# Patient Record
Sex: MALE | Race: WHITE | Employment: UNEMPLOYED | URBAN - METROPOLITAN AREA
[De-identification: names, ages, dates, MRNs, and addresses within clinical notes are randomized per-mention and may not be internally consistent; named-entity substitution may affect disease eponyms.]

---

## 2017-05-09 ENCOUNTER — APPOINTMENT (OUTPATIENT)
Dept: GENERAL RADIOLOGY | Age: 67
DRG: 198 | End: 2017-05-09
Attending: EMERGENCY MEDICINE
Payer: MEDICAID

## 2017-05-09 ENCOUNTER — HOSPITAL ENCOUNTER (INPATIENT)
Age: 67
LOS: 1 days | Discharge: HOME | DRG: 198 | End: 2017-05-11
Attending: EMERGENCY MEDICINE | Admitting: INTERNAL MEDICINE
Payer: MEDICAID

## 2017-05-09 ENCOUNTER — APPOINTMENT (OUTPATIENT)
Dept: CT IMAGING | Age: 67
DRG: 198 | End: 2017-05-09
Attending: EMERGENCY MEDICINE
Payer: MEDICAID

## 2017-05-09 DIAGNOSIS — E78.01 FAMILIAL HYPERCHOLESTEROLEMIA: ICD-10-CM

## 2017-05-09 DIAGNOSIS — E11.9 CONTROLLED TYPE 2 DIABETES MELLITUS WITHOUT COMPLICATION, WITH LONG-TERM CURRENT USE OF INSULIN (HCC): ICD-10-CM

## 2017-05-09 DIAGNOSIS — Z79.4 CONTROLLED TYPE 2 DIABETES MELLITUS WITHOUT COMPLICATION, WITH LONG-TERM CURRENT USE OF INSULIN (HCC): ICD-10-CM

## 2017-05-09 DIAGNOSIS — R42 VERTIGO: Primary | ICD-10-CM

## 2017-05-09 DIAGNOSIS — I25.708 CORONARY ARTERY DISEASE OF BYPASS GRAFT OF NATIVE HEART WITH STABLE ANGINA PECTORIS (HCC): ICD-10-CM

## 2017-05-09 DIAGNOSIS — Z86.73 HISTORY OF STROKE: ICD-10-CM

## 2017-05-09 DIAGNOSIS — R42 DIZZINESS AND GIDDINESS: ICD-10-CM

## 2017-05-09 LAB
ALBUMIN SERPL BCP-MCNC: 3.8 G/DL (ref 3.5–5)
ALBUMIN/GLOB SERPL: 1 {RATIO} (ref 1.1–2.2)
ALP SERPL-CCNC: 100 U/L (ref 45–117)
ALT SERPL-CCNC: 33 U/L (ref 12–78)
ANION GAP BLD CALC-SCNC: 8 MMOL/L (ref 5–15)
AST SERPL W P-5'-P-CCNC: 14 U/L (ref 15–37)
BASOPHILS # BLD AUTO: 0 K/UL (ref 0–0.1)
BASOPHILS # BLD: 0 % (ref 0–1)
BILIRUB SERPL-MCNC: 0.9 MG/DL (ref 0.2–1)
BUN SERPL-MCNC: 15 MG/DL (ref 6–20)
BUN/CREAT SERPL: 14 (ref 12–20)
CALCIUM SERPL-MCNC: 9 MG/DL (ref 8.5–10.1)
CHLORIDE SERPL-SCNC: 105 MMOL/L (ref 97–108)
CK SERPL-CCNC: 131 U/L (ref 39–308)
CO2 SERPL-SCNC: 26 MMOL/L (ref 21–32)
CREAT SERPL-MCNC: 1.08 MG/DL (ref 0.7–1.3)
EOSINOPHIL # BLD: 0.2 K/UL (ref 0–0.4)
EOSINOPHIL NFR BLD: 2 % (ref 0–7)
ERYTHROCYTE [DISTWIDTH] IN BLOOD BY AUTOMATED COUNT: 12.8 % (ref 11.5–14.5)
GLOBULIN SER CALC-MCNC: 4 G/DL (ref 2–4)
GLUCOSE BLD STRIP.AUTO-MCNC: 186 MG/DL (ref 65–100)
GLUCOSE SERPL-MCNC: 191 MG/DL (ref 65–100)
HCT VFR BLD AUTO: 41.6 % (ref 36.6–50.3)
HGB BLD-MCNC: 14.5 G/DL (ref 12.1–17)
LYMPHOCYTES # BLD AUTO: 20 % (ref 12–49)
LYMPHOCYTES # BLD: 1.5 K/UL (ref 0.8–3.5)
MCH RBC QN AUTO: 29.2 PG (ref 26–34)
MCHC RBC AUTO-ENTMCNC: 34.9 G/DL (ref 30–36.5)
MCV RBC AUTO: 83.9 FL (ref 80–99)
MONOCYTES # BLD: 0.6 K/UL (ref 0–1)
MONOCYTES NFR BLD AUTO: 8 % (ref 5–13)
NEUTS SEG # BLD: 5.4 K/UL (ref 1.8–8)
NEUTS SEG NFR BLD AUTO: 70 % (ref 32–75)
PLATELET # BLD AUTO: 228 K/UL (ref 150–400)
POTASSIUM SERPL-SCNC: 3.7 MMOL/L (ref 3.5–5.1)
PROT SERPL-MCNC: 7.8 G/DL (ref 6.4–8.2)
RBC # BLD AUTO: 4.96 M/UL (ref 4.1–5.7)
SERVICE CMNT-IMP: ABNORMAL
SODIUM SERPL-SCNC: 139 MMOL/L (ref 136–145)
TROPONIN I SERPL-MCNC: <0.04 NG/ML
WBC # BLD AUTO: 7.6 K/UL (ref 4.1–11.1)

## 2017-05-09 PROCEDURE — 96374 THER/PROPH/DIAG INJ IV PUSH: CPT

## 2017-05-09 PROCEDURE — 93005 ELECTROCARDIOGRAM TRACING: CPT

## 2017-05-09 PROCEDURE — 36415 COLL VENOUS BLD VENIPUNCTURE: CPT | Performed by: EMERGENCY MEDICINE

## 2017-05-09 PROCEDURE — 82962 GLUCOSE BLOOD TEST: CPT

## 2017-05-09 PROCEDURE — 74011000250 HC RX REV CODE- 250: Performed by: EMERGENCY MEDICINE

## 2017-05-09 PROCEDURE — 85025 COMPLETE CBC W/AUTO DIFF WBC: CPT | Performed by: EMERGENCY MEDICINE

## 2017-05-09 PROCEDURE — 99285 EMERGENCY DEPT VISIT HI MDM: CPT

## 2017-05-09 PROCEDURE — 84484 ASSAY OF TROPONIN QUANT: CPT | Performed by: EMERGENCY MEDICINE

## 2017-05-09 PROCEDURE — 71020 XR CHEST PA LAT: CPT

## 2017-05-09 PROCEDURE — 94761 N-INVAS EAR/PLS OXIMETRY MLT: CPT

## 2017-05-09 PROCEDURE — 74011250636 HC RX REV CODE- 250/636: Performed by: EMERGENCY MEDICINE

## 2017-05-09 PROCEDURE — 74011250637 HC RX REV CODE- 250/637: Performed by: EMERGENCY MEDICINE

## 2017-05-09 PROCEDURE — 70450 CT HEAD/BRAIN W/O DYE: CPT

## 2017-05-09 PROCEDURE — 80053 COMPREHEN METABOLIC PANEL: CPT | Performed by: EMERGENCY MEDICINE

## 2017-05-09 PROCEDURE — 96361 HYDRATE IV INFUSION ADD-ON: CPT

## 2017-05-09 PROCEDURE — 82550 ASSAY OF CK (CPK): CPT | Performed by: EMERGENCY MEDICINE

## 2017-05-09 RX ORDER — ASPIRIN 325 MG
325 TABLET ORAL ONCE
Status: COMPLETED | OUTPATIENT
Start: 2017-05-09 | End: 2017-05-09

## 2017-05-09 RX ORDER — ATORVASTATIN CALCIUM 10 MG/1
10 TABLET, FILM COATED ORAL DAILY
Status: ON HOLD | COMMUNITY
End: 2017-05-10

## 2017-05-09 RX ORDER — GUAIFENESIN 100 MG/5ML
81 LIQUID (ML) ORAL DAILY
COMMUNITY

## 2017-05-09 RX ORDER — GLIPIZIDE 10 MG/1
10 TABLET ORAL 2 TIMES DAILY
COMMUNITY

## 2017-05-09 RX ORDER — MECLIZINE HCL 12.5 MG 12.5 MG/1
25 TABLET ORAL
Status: COMPLETED | OUTPATIENT
Start: 2017-05-09 | End: 2017-05-09

## 2017-05-09 RX ORDER — ASPIRIN 325 MG
325 TABLET ORAL
Status: DISCONTINUED | OUTPATIENT
Start: 2017-05-09 | End: 2017-05-09

## 2017-05-09 RX ADMIN — SODIUM CHLORIDE 1000 ML: 900 INJECTION, SOLUTION INTRAVENOUS at 20:42

## 2017-05-09 RX ADMIN — SODIUM CHLORIDE 10 MG: 9 INJECTION INTRAMUSCULAR; INTRAVENOUS; SUBCUTANEOUS at 20:42

## 2017-05-09 RX ADMIN — MECLIZINE 25 MG: 12.5 TABLET ORAL at 21:30

## 2017-05-09 RX ADMIN — ASPIRIN 325 MG ORAL TABLET 325 MG: 325 PILL ORAL at 23:26

## 2017-05-09 NOTE — IP AVS SNAPSHOT
[address] [facility] 
[phone] Patient: Vasiliy Springer MRN: [id number] XIK:[id number]/4/2064 You are allergic to the following No active allergies Recent Documentation Height Weight BMI Smoking Status 1.6 m 61.1 kg 23.86 kg/m2 Never Smoker Emergency Contacts Name Discharge Info Relation Home Work Mobile [address] [facility] CAREGIVER [3] Child [2] [phone] About your hospitalization You were admitted on:  May 10, 2017 You last received care in the:  [facility] 3 NEUROSCIENCE TELEMETRY You were discharged on:  May 11, 2017 Unit phone number:  [phone] Why you were hospitalized Your primary diagnosis was:  Not on File Your diagnoses also included:  Chest Pain Providers Seen During Your Hospitalizations Provider Role Specialty Primary office phone Jose Lóepz MD Attending Provider Emergency Medicine [phone] Ellis Miranda MD Attending Provider Internal Medicine [phone] Your Primary Care Physician (PCP) Primary Care Physician Office Phone Office Fax NONE ** None ** ** None ** Follow-up Information Follow up With Details Comments Contact Info None   None (395) Patient stated that they have no PCP Current Discharge Medication List  
  
CONTINUE these medications which have CHANGED Dose & Instructions Dispensing Information Comments Morning Noon Evening Bedtime  
 atorvastatin 20 mg tablet Commonly known as:  LIPITOR What changed:  Another medication with the same name was removed. Continue taking this medication, and follow the directions you see here. Your last dose was: Your next dose is:    
   
   
 Dose:  20 mg Take 20 mg by mouth daily. Refills:  0  
     
   
   
   
  
 metoprolol tartrate 25 mg tablet Commonly known as:  LOPRESSOR  
 What changed:   
- how much to take - when to take this Your last dose was: Your next dose is:    
   
   
 Dose:  12.5 mg Take 0.5 Tabs by mouth every twelve (12) hours for 30 days. Quantity:  30 Tab Refills:  0 CONTINUE these medications which have NOT CHANGED Dose & Instructions Dispensing Information Comments Morning Noon Evening Bedtime  
 aspirin 81 mg chewable tablet Your last dose was: Your next dose is:    
   
   
 Dose:  81 mg Take 81 mg by mouth daily. Refills:  0  
     
   
   
   
  
 clopidogrel 75 mg Tab Commonly known as:  PLAVIX Your last dose was: Your next dose is:    
   
   
 Dose:  75 mg Take 75 mg by mouth daily. Refills:  0  
     
   
   
   
  
 fish oil-omega-3 fatty acids 300-500 mg Cap Your last dose was: Your next dose is:    
   
   
 Dose:  1000 mg Take 1,000 mg by mouth. Refills:  0  
     
   
   
   
  
 glipiZIDE 10 mg tablet Commonly known as:  Majo Galvan Your last dose was: Your next dose is:    
   
   
 Dose:  10 mg Take 10 mg by mouth two (2) times a day. Refills:  0  
     
   
   
   
  
 valsartan 160 mg tablet Commonly known as:  DIOVAN Your last dose was: Your next dose is:    
   
   
 Dose:  160 mg Take 160 mg by mouth daily. Refills:  0 STOP taking these medications   
 glyBURIDE 1.25 mg tablet Commonly known as:  Davion Hilliard Where to Get Your Medications Information on where to get these meds will be given to you by the nurse or doctor. ! Ask your nurse or doctor about these medications  
  metoprolol tartrate 25 mg tablet Discharge Instructions None Discharge Orders Procedure Order Date Status Priority Quantity Spec Type Associated Dx ACTIVITY AFTER DISCHARGE Patient should: Resume activity as tolerated. 05/11/17 1251 Normal Routine 1 Questions: Patient should:  Resume activity as tolerated. DIET CARDIAC No options chosen 05/11/17 1251 Normal Routine 1 Questions: Additional options:  No options chosen Introducing [facility] SERVICES! [facility] introduces [employer] patient portal. Now you can access parts of your medical record, email your doctor's office, and request medication refills online. 1. In your internet browser, go to [email or web address] 2. Click on the First Time User? Click Here link in the Sign In box. You will see the New Member Sign Up page. 3. Enter your [employer] Access Code exactly as it appears below. You will not need to use this code after youve completed the sign-up process. If you do not sign up before the expiration date, you must request a new code. · [employer] Access Code: [email or web address] Expires: 8/7/2017  8:23 PM 
 
4. Enter the last four digits of your Social Security Number (xxxx) and Date of Birth (mm/dd/yyyy) as indicated and click Submit. You will be taken to the next sign-up page. 5. Create a [employer] ID. This will be your [employer] login ID and cannot be changed, so think of one that is secure and easy to remember. 6. Create a [employer] password. You can change your password at any time. 7. Enter your Password Reset Question and Answer. This can be used at a later time if you forget your password. 8. Enter your e-mail address. You will receive e-mail notification when new information is available in [address] 9. Click Sign Up. You can now view and download portions of your medical record. 10. Click the Download Summary menu link to download a portable copy of your medical information. If you have questions, please visit the Frequently Asked Questions section of the [employer] website. Remember, [employer] is NOT to be used for urgent needs. For medical emergencies, dial 911. Now available from your iPhone and Android! General Information Please provide this summary of care documentation to your next provider. Patient Signature:  ____________________________________________________________ Date:  ____________________________________________________________  
  
Arabella Canes Provider Signature:  ____________________________________________________________ Date:  ____________________________________________________________

## 2017-05-09 NOTE — IP AVS SNAPSHOT
Current Discharge Medication List  
  
CONTINUE these medications which have CHANGED Dose & Instructions Dispensing Information Comments Morning Noon Evening Bedtime  
 atorvastatin 20 mg tablet Commonly known as:  LIPITOR What changed:  Another medication with the same name was removed. Continue taking this medication, and follow the directions you see here. Your last dose was: Your next dose is:    
   
   
 Dose:  20 mg Take 20 mg by mouth daily. Refills:  0  
     
   
   
   
  
 metoprolol tartrate 25 mg tablet Commonly known as:  LOPRESSOR What changed:   
- how much to take - when to take this Your last dose was: Your next dose is:    
   
   
 Dose:  12.5 mg Take 0.5 Tabs by mouth every twelve (12) hours for 30 days. Quantity:  30 Tab Refills:  0 CONTINUE these medications which have NOT CHANGED Dose & Instructions Dispensing Information Comments Morning Noon Evening Bedtime  
 aspirin 81 mg chewable tablet Your last dose was: Your next dose is:    
   
   
 Dose:  81 mg Take 81 mg by mouth daily. Refills:  0  
     
   
   
   
  
 clopidogrel 75 mg Tab Commonly known as:  PLAVIX Your last dose was: Your next dose is:    
   
   
 Dose:  75 mg Take 75 mg by mouth daily. Refills:  0  
     
   
   
   
  
 fish oil-omega-3 fatty acids 300-500 mg Cap Your last dose was: Your next dose is:    
   
   
 Dose:  1000 mg Take 1,000 mg by mouth. Refills:  0  
     
   
   
   
  
 glipiZIDE 10 mg tablet Commonly known as:  Gui Lover Your last dose was: Your next dose is:    
   
   
 Dose:  10 mg Take 10 mg by mouth two (2) times a day. Refills:  0  
     
   
   
   
  
 valsartan 160 mg tablet Commonly known as:  DIOVAN Your last dose was:     
   
Your next dose is:    
   
   
 Dose:  160 mg  
 Take 160 mg by mouth daily. Refills:  0 STOP taking these medications   
 glyBURIDE 1.25 mg tablet Commonly known as:  Katie Yost Where to Get Your Medications Information on where to get these meds will be given to you by the nurse or doctor. ! Ask your nurse or doctor about these medications  
  metoprolol tartrate 25 mg tablet

## 2017-05-09 NOTE — IP AVS SNAPSHOT
Summary of Care Report The Summary of Care report has been created to help improve care coordination. Users with access to [employer] or [address] (Web-based application) may access additional patient information including the Discharge Summary. If you are not currently a [address] user and need more information, please call the number listed below in the [place] section and ask to be connected with Medical Records. Facility Information Name Address Phone [address] [address] [phone] Patient Information Patient Name Sex STACY Izquierdo ([id number]) Male 1950 Discharge Information Admitting Provider Service Area Unit Jonny Mello MD / [phone]  [phone] Discharge Provider Discharge Date/Time Discharge Disposition Destination (none) 2017 (Pending) AHR (none) Patient Language Language ENGLISH [13] Hospital Problems as of 2017  Never Reviewed Class Noted - Resolved Last Modified POA Active Problems Chest pain  5/10/2017 - Present 5/10/2017 by Jonny Mello MD Unknown Entered by Jonny Mello MD  
  
You are allergic to the following No active allergies Current Discharge Medication List  
  
CONTINUE these medications which have CHANGED Dose & Instructions Dispensing Information Comments  
 atorvastatin 20 mg tablet Commonly known as:  LIPITOR What changed:  Another medication with the same name was removed. Continue taking this medication, and follow the directions you see here. Dose:  20 mg Take 20 mg by mouth daily. Refills:  0  
   
 metoprolol tartrate 25 mg tablet Commonly known as:  LOPRESSOR What changed:   
- how much to take - when to take this  Dose:  12.5 mg  
 Take 0.5 Tabs by mouth every twelve (12) hours for 30 days. Quantity:  30 Tab Refills:  0 CONTINUE these medications which have NOT CHANGED Dose & Instructions Dispensing Information Comments  
 aspirin 81 mg chewable tablet Dose:  81 mg Take 81 mg by mouth daily. Refills:  0  
   
 clopidogrel 75 mg Tab Commonly known as:  PLAVIX Dose:  75 mg Take 75 mg by mouth daily. Refills:  0  
   
 fish oil-omega-3 fatty acids 300-500 mg Cap Dose:  1000 mg Take 1,000 mg by mouth. Refills:  0  
   
 glipiZIDE 10 mg tablet Commonly known as:  Majo Galvan Dose:  10 mg Take 10 mg by mouth two (2) times a day. Refills:  0  
   
 valsartan 160 mg tablet Commonly known as:  DIOVAN Dose:  160 mg Take 160 mg by mouth daily. Refills:  0 STOP taking these medications Comments  
 glyBURIDE 1.25 mg tablet Commonly known as:  Davion Hilliard Follow-up Information Follow up With Details Comments Contact Info None   None (395) Patient stated that they have no PCP Discharge Instructions None Chart Review Routing History No Routing History on File

## 2017-05-10 ENCOUNTER — APPOINTMENT (OUTPATIENT)
Dept: MRI IMAGING | Age: 67
DRG: 198 | End: 2017-05-10
Attending: INTERNAL MEDICINE
Payer: MEDICAID

## 2017-05-10 PROBLEM — R07.9 CHEST PAIN: Status: ACTIVE | Noted: 2017-05-10

## 2017-05-10 LAB
ANION GAP BLD CALC-SCNC: 9 MMOL/L (ref 5–15)
ATRIAL RATE: 78 BPM
BUN SERPL-MCNC: 15 MG/DL (ref 6–20)
BUN/CREAT SERPL: 15 (ref 12–20)
CALCIUM SERPL-MCNC: 8.2 MG/DL (ref 8.5–10.1)
CALCULATED P AXIS, ECG09: 30 DEGREES
CALCULATED R AXIS, ECG10: -39 DEGREES
CALCULATED T AXIS, ECG11: 132 DEGREES
CHLORIDE SERPL-SCNC: 108 MMOL/L (ref 97–108)
CHOLEST SERPL-MCNC: 127 MG/DL
CO2 SERPL-SCNC: 25 MMOL/L (ref 21–32)
CREAT SERPL-MCNC: 0.99 MG/DL (ref 0.7–1.3)
DIAGNOSIS, 93000: NORMAL
ERYTHROCYTE [DISTWIDTH] IN BLOOD BY AUTOMATED COUNT: 12.9 % (ref 11.5–14.5)
EST. AVERAGE GLUCOSE BLD GHB EST-MCNC: 174 MG/DL
GLUCOSE BLD STRIP.AUTO-MCNC: 116 MG/DL (ref 65–100)
GLUCOSE BLD STRIP.AUTO-MCNC: 130 MG/DL (ref 65–100)
GLUCOSE BLD STRIP.AUTO-MCNC: 247 MG/DL (ref 65–100)
GLUCOSE BLD STRIP.AUTO-MCNC: 250 MG/DL (ref 65–100)
GLUCOSE SERPL-MCNC: 123 MG/DL (ref 65–100)
HBA1C MFR BLD: 7.7 % (ref 4.2–6.3)
HCT VFR BLD AUTO: 36.3 % (ref 36.6–50.3)
HDLC SERPL-MCNC: 47 MG/DL
HDLC SERPL: 2.7 {RATIO} (ref 0–5)
HGB BLD-MCNC: 12 G/DL (ref 12.1–17)
LDLC SERPL CALC-MCNC: 61.2 MG/DL (ref 0–100)
LIPID PROFILE,FLP: NORMAL
MCH RBC QN AUTO: 27.9 PG (ref 26–34)
MCHC RBC AUTO-ENTMCNC: 33.1 G/DL (ref 30–36.5)
MCV RBC AUTO: 84.4 FL (ref 80–99)
P-R INTERVAL, ECG05: 144 MS
PLATELET # BLD AUTO: 191 K/UL (ref 150–400)
POTASSIUM SERPL-SCNC: 3.6 MMOL/L (ref 3.5–5.1)
Q-T INTERVAL, ECG07: 384 MS
QRS DURATION, ECG06: 104 MS
QTC CALCULATION (BEZET), ECG08: 437 MS
RBC # BLD AUTO: 4.3 M/UL (ref 4.1–5.7)
SERVICE CMNT-IMP: ABNORMAL
SODIUM SERPL-SCNC: 142 MMOL/L (ref 136–145)
TRIGL SERPL-MCNC: 94 MG/DL (ref ?–150)
TROPONIN I SERPL-MCNC: <0.04 NG/ML
TROPONIN I SERPL-MCNC: <0.04 NG/ML
VENTRICULAR RATE, ECG03: 78 BPM
VLDLC SERPL CALC-MCNC: 18.8 MG/DL
WBC # BLD AUTO: 8 K/UL (ref 4.1–11.1)

## 2017-05-10 PROCEDURE — 83036 HEMOGLOBIN GLYCOSYLATED A1C: CPT | Performed by: INTERNAL MEDICINE

## 2017-05-10 PROCEDURE — 97161 PT EVAL LOW COMPLEX 20 MIN: CPT

## 2017-05-10 PROCEDURE — 74011250636 HC RX REV CODE- 250/636: Performed by: INTERNAL MEDICINE

## 2017-05-10 PROCEDURE — 74011250637 HC RX REV CODE- 250/637: Performed by: INTERNAL MEDICINE

## 2017-05-10 PROCEDURE — 97116 GAIT TRAINING THERAPY: CPT

## 2017-05-10 PROCEDURE — 70547 MR ANGIOGRAPHY NECK W/O DYE: CPT

## 2017-05-10 PROCEDURE — 74011000258 HC RX REV CODE- 258: Performed by: INTERNAL MEDICINE

## 2017-05-10 PROCEDURE — 93306 TTE W/DOPPLER COMPLETE: CPT

## 2017-05-10 PROCEDURE — 85027 COMPLETE CBC AUTOMATED: CPT | Performed by: INTERNAL MEDICINE

## 2017-05-10 PROCEDURE — 80048 BASIC METABOLIC PNL TOTAL CA: CPT | Performed by: INTERNAL MEDICINE

## 2017-05-10 PROCEDURE — 74011250637 HC RX REV CODE- 250/637: Performed by: EMERGENCY MEDICINE

## 2017-05-10 PROCEDURE — 36415 COLL VENOUS BLD VENIPUNCTURE: CPT | Performed by: INTERNAL MEDICINE

## 2017-05-10 PROCEDURE — 80061 LIPID PANEL: CPT | Performed by: INTERNAL MEDICINE

## 2017-05-10 PROCEDURE — 74011636637 HC RX REV CODE- 636/637: Performed by: EMERGENCY MEDICINE

## 2017-05-10 PROCEDURE — 84484 ASSAY OF TROPONIN QUANT: CPT | Performed by: INTERNAL MEDICINE

## 2017-05-10 PROCEDURE — 70551 MRI BRAIN STEM W/O DYE: CPT

## 2017-05-10 PROCEDURE — 82962 GLUCOSE BLOOD TEST: CPT

## 2017-05-10 PROCEDURE — 65660000000 HC RM CCU STEPDOWN

## 2017-05-10 PROCEDURE — 70544 MR ANGIOGRAPHY HEAD W/O DYE: CPT

## 2017-05-10 RX ORDER — INSULIN LISPRO 100 [IU]/ML
INJECTION, SOLUTION INTRAVENOUS; SUBCUTANEOUS
Status: DISCONTINUED | OUTPATIENT
Start: 2017-05-10 | End: 2017-05-11 | Stop reason: HOSPADM

## 2017-05-10 RX ORDER — ATORVASTATIN CALCIUM 10 MG/1
10 TABLET, FILM COATED ORAL DAILY
Status: DISCONTINUED | OUTPATIENT
Start: 2017-05-10 | End: 2017-05-10

## 2017-05-10 RX ORDER — DEXTROSE 50 % IN WATER (D50W) INTRAVENOUS SYRINGE
12.5-25 AS NEEDED
Status: DISCONTINUED | OUTPATIENT
Start: 2017-05-10 | End: 2017-05-11 | Stop reason: HOSPADM

## 2017-05-10 RX ORDER — VALSARTAN 160 MG/1
160 TABLET ORAL DAILY
Status: DISCONTINUED | OUTPATIENT
Start: 2017-05-11 | End: 2017-05-11 | Stop reason: HOSPADM

## 2017-05-10 RX ORDER — ENOXAPARIN SODIUM 100 MG/ML
40 INJECTION SUBCUTANEOUS DAILY
Status: DISCONTINUED | OUTPATIENT
Start: 2017-05-10 | End: 2017-05-11 | Stop reason: HOSPADM

## 2017-05-10 RX ORDER — LISINOPRIL 5 MG/1
10 TABLET ORAL DAILY
Status: DISCONTINUED | OUTPATIENT
Start: 2017-05-10 | End: 2017-05-10

## 2017-05-10 RX ORDER — SODIUM CHLORIDE 0.9 % (FLUSH) 0.9 %
5-10 SYRINGE (ML) INJECTION AS NEEDED
Status: DISCONTINUED | OUTPATIENT
Start: 2017-05-10 | End: 2017-05-11 | Stop reason: HOSPADM

## 2017-05-10 RX ORDER — CLOPIDOGREL BISULFATE 75 MG/1
75 TABLET ORAL DAILY
COMMUNITY

## 2017-05-10 RX ORDER — ATORVASTATIN CALCIUM 20 MG/1
20 TABLET, FILM COATED ORAL DAILY
Status: DISCONTINUED | OUTPATIENT
Start: 2017-05-11 | End: 2017-05-11 | Stop reason: HOSPADM

## 2017-05-10 RX ORDER — GUAIFENESIN 100 MG/5ML
81 LIQUID (ML) ORAL DAILY
Status: DISCONTINUED | OUTPATIENT
Start: 2017-05-10 | End: 2017-05-11 | Stop reason: HOSPADM

## 2017-05-10 RX ORDER — METOPROLOL TARTRATE 25 MG/1
25 TABLET, FILM COATED ORAL DAILY
COMMUNITY
End: 2017-05-11

## 2017-05-10 RX ORDER — GLIPIZIDE 5 MG/1
10 TABLET ORAL 2 TIMES DAILY
Status: DISCONTINUED | OUTPATIENT
Start: 2017-05-10 | End: 2017-05-10

## 2017-05-10 RX ORDER — VALSARTAN 160 MG/1
160 TABLET ORAL DAILY
COMMUNITY

## 2017-05-10 RX ORDER — ACETAMINOPHEN 325 MG/1
650 TABLET ORAL
Status: DISCONTINUED | OUTPATIENT
Start: 2017-05-10 | End: 2017-05-11 | Stop reason: HOSPADM

## 2017-05-10 RX ORDER — ONDANSETRON 2 MG/ML
4 INJECTION INTRAMUSCULAR; INTRAVENOUS
Status: DISCONTINUED | OUTPATIENT
Start: 2017-05-10 | End: 2017-05-11 | Stop reason: HOSPADM

## 2017-05-10 RX ORDER — BISACODYL 5 MG
5 TABLET, DELAYED RELEASE (ENTERIC COATED) ORAL DAILY PRN
Status: DISCONTINUED | OUTPATIENT
Start: 2017-05-10 | End: 2017-05-11 | Stop reason: HOSPADM

## 2017-05-10 RX ORDER — SODIUM CHLORIDE 0.9 % (FLUSH) 0.9 %
5-10 SYRINGE (ML) INJECTION EVERY 8 HOURS
Status: DISCONTINUED | OUTPATIENT
Start: 2017-05-10 | End: 2017-05-11 | Stop reason: HOSPADM

## 2017-05-10 RX ORDER — MAGNESIUM SULFATE 100 %
4 CRYSTALS MISCELLANEOUS AS NEEDED
Status: DISCONTINUED | OUTPATIENT
Start: 2017-05-10 | End: 2017-05-11 | Stop reason: HOSPADM

## 2017-05-10 RX ORDER — METOPROLOL TARTRATE 25 MG/1
25 TABLET, FILM COATED ORAL DAILY
Status: DISCONTINUED | OUTPATIENT
Start: 2017-05-11 | End: 2017-05-10

## 2017-05-10 RX ORDER — ATORVASTATIN CALCIUM 20 MG/1
20 TABLET, FILM COATED ORAL DAILY
COMMUNITY

## 2017-05-10 RX ORDER — GLYBURIDE 1.25 MG/1
3.5 TABLET ORAL
Status: ON HOLD | COMMUNITY
End: 2017-05-10

## 2017-05-10 RX ORDER — INSULIN LISPRO 100 [IU]/ML
INJECTION, SOLUTION INTRAVENOUS; SUBCUTANEOUS
Status: DISCONTINUED | OUTPATIENT
Start: 2017-05-10 | End: 2017-05-10

## 2017-05-10 RX ORDER — METOPROLOL TARTRATE 25 MG/1
12.5 TABLET, FILM COATED ORAL EVERY 12 HOURS
Status: DISCONTINUED | OUTPATIENT
Start: 2017-05-10 | End: 2017-05-11 | Stop reason: HOSPADM

## 2017-05-10 RX ORDER — CLOPIDOGREL BISULFATE 75 MG/1
75 TABLET ORAL DAILY
Status: DISCONTINUED | OUTPATIENT
Start: 2017-05-11 | End: 2017-05-11 | Stop reason: HOSPADM

## 2017-05-10 RX ORDER — SODIUM CHLORIDE 450 MG/100ML
50 INJECTION, SOLUTION INTRAVENOUS CONTINUOUS
Status: DISCONTINUED | OUTPATIENT
Start: 2017-05-10 | End: 2017-05-11 | Stop reason: HOSPADM

## 2017-05-10 RX ADMIN — ENOXAPARIN SODIUM 40 MG: 40 INJECTION SUBCUTANEOUS at 08:26

## 2017-05-10 RX ADMIN — ATORVASTATIN CALCIUM 10 MG: 10 TABLET, FILM COATED ORAL at 08:27

## 2017-05-10 RX ADMIN — SODIUM CHLORIDE 50 ML/HR: 450 INJECTION, SOLUTION INTRAVENOUS at 14:00

## 2017-05-10 RX ADMIN — Medication 10 ML: at 14:14

## 2017-05-10 RX ADMIN — LISINOPRIL 10 MG: 5 TABLET ORAL at 08:26

## 2017-05-10 RX ADMIN — Medication 10 ML: at 21:29

## 2017-05-10 RX ADMIN — SODIUM CHLORIDE 50 ML/HR: 450 INJECTION, SOLUTION INTRAVENOUS at 02:55

## 2017-05-10 RX ADMIN — INSULIN LISPRO 2 UNITS: 100 INJECTION, SOLUTION INTRAVENOUS; SUBCUTANEOUS at 21:36

## 2017-05-10 RX ADMIN — ASPIRIN 81 MG CHEWABLE TABLET 81 MG: 81 TABLET CHEWABLE at 08:27

## 2017-05-10 RX ADMIN — METOPROLOL TARTRATE 12.5 MG: 25 TABLET ORAL at 21:30

## 2017-05-10 RX ADMIN — INSULIN LISPRO 5 UNITS: 100 INJECTION, SOLUTION INTRAVENOUS; SUBCUTANEOUS at 13:25

## 2017-05-10 NOTE — CONSULTS
IP CONSULT TO NEUROLOGY  Consult performed by: Natan Fletcher  Consult ordered by: Maggy Rios            NEUROLOGY CONSULT    NAME Jay Dinh AGE 74 y.o. MRN [id number]  1950     REQUESTING PHYSICIAN: Elia Messer MD      CHIEF COMPLAINT: abnormal ct     This is a 74 y.o. right-handed male with a medical history of diabetes and hyperlipidemia. The patient developed sudden onset nausea vomiting and dizziness yesterday. He was brought to the emergency department for medical attention head CT showed abnormal findings and he was admitted for further evaluation. On speaking with the family who are interpreting for the patient apparently he suffered acute onset right-sided sensory loss approximately 3 years ago. He did not seek medical attention because the symptoms resolved within a few hours of onset. He feels back to baseline at this moment. Cardiology is evaluating for ischemic heart disease. ASSESSMENT AND PLAN     1. Dizziness nausea and vomiting  Unclear etiology not consistent with CT findings which appeared to be incidental.    2.  Remote stroke  There is factors include diabetes and hyperlipidemia continue Plavix. LDL 61.2, A1c 7.7, TSH pending, carotid, MRA reviewed,      3. Hyperlipidemia  Continue atorvastatin target LDL is 70 or below    4. Coronary artery disease with exertional angina  Continue plavix    5. Diabetes  Continue insulin    ALLERGIES:  Review of patient's allergies indicates no known allergies.      Current Facility-Administered Medications   Medication Dose Route Frequency    aspirin chewable tablet 81 mg  81 mg Oral DAILY    sodium chloride (NS) flush 5-10 mL  5-10 mL IntraVENous Q8H    sodium chloride (NS) flush 5-10 mL  5-10 mL IntraVENous PRN    acetaminophen (TYLENOL) tablet 650 mg  650 mg Oral Q4H PRN    ondansetron (ZOFRAN) injection 4 mg  4 mg IntraVENous Q4H PRN    bisacodyl (DULCOLAX) tablet 5 mg  5 mg Oral DAILY PRN    enoxaparin (LOVENOX) injection 40 mg  40 mg SubCUTAneous DAILY    glucose chewable tablet 16 g  4 Tab Oral PRN    dextrose (D50W) injection syrg 12.5-25 g  12.5-25 g IntraVENous PRN    glucagon (GLUCAGEN) injection 1 mg  1 mg IntraMUSCular PRN    nitroglycerin (NITROBID) 2 % ointment 1 Inch  1 Inch Topical Q6H PRN    0.45% sodium chloride infusion  50 mL/hr IntraVENous CONTINUOUS    insulin lispro (HUMALOG) injection   SubCUTAneous AC&HS    [START ON 5/11/2017] atorvastatin (LIPITOR) tablet 20 mg  20 mg Oral DAILY    [START ON 5/11/2017] clopidogrel (PLAVIX) tablet 75 mg  75 mg Oral DAILY    [START ON 5/11/2017] fish oil-omega-3 fatty acids 340-1,000 mg capsule 1 Cap  1 Cap Oral DAILY    [START ON 5/11/2017] valsartan (DIOVAN) tablet 160 mg  160 mg Oral DAILY    metoprolol tartrate (LOPRESSOR) tablet 12.5 mg  12.5 mg Oral Q12H       Past Medical History:   Diagnosis Date    CAD (coronary artery disease)     [facility]     Hypercholesteremia     Hypertension        Social History   Substance Use Topics    Smoking status: Never Smoker    Smokeless tobacco: Not on file    Alcohol use Not on file       Family History   Problem Relation Age of Onset    Diabetes Brother      Review of Systems   Constitutional: Negative for chills and fever. HENT: Negative for ear pain. Eyes: Negative for pain and discharge. Respiratory: Negative for cough and hemoptysis. Cardiovascular: Negative for chest pain and claudication. Gastrointestinal: Negative for constipation and diarrhea. Genitourinary: Negative for flank pain and hematuria. Musculoskeletal: Negative for back pain and myalgias. Skin: Negative for itching and rash. Neurological: Negative for headaches. Endo/Heme/Allergies: Negative for environmental allergies. Does not bruise/bleed easily. Psychiatric/Behavioral: Negative for depression and hallucinations.          Visit Vitals    /63    Pulse 80    Temp 99 °F (37.2 °C)    Resp 18    Ht 5' 3\" (1.6 m)    Wt 134 lb 11.2 oz (61.1 kg)    SpO2 98%    BMI 23.86 kg/m2      Physical Exam   Constitutional: He is oriented to person, place, and time and well-developed, well-nourished, and in no distress. HENT:   Head: Normocephalic and atraumatic. Eyes: Right eye visual fields normal and left eye visual fields normal. EOM are normal. Pupils are equal, round, and reactive to light. Neck: Neck supple. No JVD present. Carotid bruit is not present. No thyromegaly present. Cardiovascular: Normal rate, regular rhythm and normal heart sounds. Pulmonary/Chest: Effort normal and breath sounds normal. No respiratory distress. He has no wheezes. He has no rales. Abdominal: Soft. Bowel sounds are normal. He exhibits no distension. There is no tenderness. Neurological: He is alert and oriented to person, place, and time. Reflex Scores:       Tricep reflexes are 1+ on the right side and 1+ on the left side. Bicep reflexes are 1+ on the right side and 1+ on the left side. Brachioradialis reflexes are 1+ on the right side and 1+ on the left side. Patellar reflexes are 1+ on the right side and 1+ on the left side. Achilles reflexes are 0 on the right side and 0 on the left side. Recent and remote memory is intact  Speech: No aphasia and no dysarthria, intact repetition. Cranial Nerves:   Symmetric facial movement  Intact facial sensation  Hearing intact bilaterally, No nystagmus  Intact bilateral gag reflex  Shoulder shrug is symmetric with no weakness  Tongue midline on protrusion  Motor: 5/5 in all major muscle groups   No tremors  Normal tone, no cogwheel rigidity  Sensory: Intact to all modalities in both proximal and distal distributions  Coordination: Finger to nose and heel over shin to knee intact on both sides  Gait: Well balanced with normal arm swing   Skin: No rash noted. No erythema. Nursing note and vitals reviewed.       REVIEWED IMAGING:    MRI :    Results from Hospital Encounter encounter on 05/09/17   MRA NECK WO CONT   Narrative CLINICAL HISTORY: Right parietal lobe ischemia INDICATION: Right parietal lobe  ischemia. COMPARISON:  5/9/2017      CORRELATION:  NA      TECHNIQUE: MR examination of the brain includes axial and sagittal T1, axial T2,  axial FLAIR, axial gradient echo, axial DWI, coronal T2. Coronal T2    Contrast:     None          Next,  3-D time-of-flight MRA of the brain was performed. Multiplanar reconstructions  were obtained. Next,  2-D time-of-flight MRA of the neck was performed. Multiplanar reconstructions  were obtained. FINDINGS:   There is no evidence of acute infarction. There is extensive abnormal increased T2 signal intensity in the corona radiata  and centrum semiovale with a large focus in the right parietal region. Small  lacunar infarcts are associated. There is sulcal and ventricular prominence. Remote lacunar infarct in the left amira as well. There is no intracranial mass, hemorrhage or evidence of acute infarction. There is no Chiari or syrinx. The pituitary and infundibulum are grossly  unremarkable. There is no skull base mass. Cerebellopontine angles are grossly  unremarkable. The major intracranial vascular flow-voids are unremarkable. The  cavernous sinuses are symmetric. Optic chiasm and infundibulum grossly  unremarkable. Orbits are grossly symmetric. Dural venous sinuses are grossly  patent. The brain architecture is normal. There is no evidence of midline shift or  mass-effect. There are no extra-axial fluid collections. The mastoid air cells and are well pneumatized and clear. Mucosal cyst in the left maxillary sinus. Posterior communicating artery on the left versus persistent fetal circulation  to the left posterior cerebral artery territory. Mild stenosis in the mid  basilar artery. Mild stenosis in the proximal left M1.  . The internal carotid,  anterior cerebral, and middle cerebral arteries are patent. There is no  flow-limiting intracranial stenosis. . . There is conventional three vessel arch anatomy. The bilateral subclavian,  common carotid, and internal carotid arteries are patent with no flow-limiting  stenosis. The vertebral arteries are codominant and patent. Impression IMPRESSION:   There is no evidence of acute infarction. Extensive chronic microvascular ischemic change for patient age, with a large  area of T2 FLAIR hyperintensity in the right parietal lobe, this is what is  demonstrated on the CT of the head 5/9/2017. There are small areas of remote  infarction in the cerebral white matter. Small remote infarction in the left  aspect of the amira. There is mild to moderate cerebral atrophy for patient age. No intracranial mass, hemorrhage or evidence of acute infarction. No aneurysm, dissection or evidence of hemodynamically significant stenosis. CT:    Results from Hospital Encounter encounter on 05/09/17   CT HEAD WO CONT   Narrative EXAM:  CT HEAD WO CONT    INDICATION:   eval for head bleed, dizziness. COMPARISON: None. TECHNIQUE: Unenhanced CT of the head was performed using 5 mm images. Brain and  bone windows were generated. CT dose reduction was achieved through use of a  standardized protocol tailored for this examination and automatic exposure  control for dose modulation. FINDINGS:  The ventricles and sulci are normal in size, shape and configuration and  midline. There is hypoattenuation in the right posterior parietal lobe. There is  a focal area of low attenuation in the brainstem which appears chronic. There is  no intracranial hemorrhage, extra-axial collection, mass, mass effect or midline  shift. The basilar cisterns are open. No acute infarct is identified. The bone  windows demonstrate no abnormalities. There is mucosal thickening of ethmoid  sinuses.          Impression IMPRESSION: Age indeterminate ischemia right parietal lobe. This could further  be assessed by MRI if indicated.             REVIEWED LABS:  Lab Results   Component Value Date/Time    WBC 8.0 05/10/2017 05:50 AM    HCT 36.3 05/10/2017 05:50 AM    HGB 12.0 05/10/2017 05:50 AM    PLATELET 618 96/22/8123 05:50 AM     Lab Results   Component Value Date/Time    Sodium 142 05/10/2017 05:50 AM    Potassium 3.6 05/10/2017 05:50 AM    Chloride 108 05/10/2017 05:50 AM    CO2 25 05/10/2017 05:50 AM    Glucose 123 05/10/2017 05:50 AM    BUN 15 05/10/2017 05:50 AM    Creatinine 0.99 05/10/2017 05:50 AM    Calcium 8.2 05/10/2017 05:50 AM     No results found for: B12LT, FOL, RBCF  Lab Results   Component Value Date/Time    LDL, calculated 61.2 05/10/2017 05:50 AM     Lab Results   Component Value Date/Time    Hemoglobin A1c 7.7 05/10/2017 05:50 AM

## 2017-05-10 NOTE — PROGRESS NOTES
* No surgery found *  Bedside shift change report given to Rosa (oncoming nurse) by Carina Nicole (offgoing nurse). Report included the following information [facility]. [facility] Phone:   [phone]      Significant changes during shift:  New admission. Doesnt speak Jesika Sharmila. Family in room to translate and blue phone in room        Patient Information    Brianna Dominguez [id number]  57 y.o.  5/9/2017  7:47 PM by Jonny Mello MD. Shaan Reddy was admitted from Home    Problem List    Patient Active Problem List    Diagnosis Date Noted    Chest pain 05/10/2017     Past Medical History:   Diagnosis Date    CAD (coronary artery disease)     Diabetes ([facility]     Hypercholesteremia     Hypertension          Core Measures:    CVA: Yes Yes  CHF:No No  PNA:No No    Activity Status:    OOB to Chair No  Ambulated this shift Yes   Bed Rest No    Supplemental O2: (If Applicable)    NC No  NRB No  Venti-mask No  On  Liters/min      LINES AND DRAINS:    DVT prophylaxis:    DVT prophylaxis Med- Yes  DVT prophylaxis SCD or OSEAS- No     Wounds: (If Applicable)    Wounds- No    Location     Patient Safety:    Falls Score Total Score: 0  Safety Level_______  Bed Alarm On? Yes  Sitter?  No    Plan for upcoming shift: PT OT Neurology to see        Discharge Plan: Yes HOme with son    Active Consults:  IP CONSULT TO TELE-NEUROLOGY  IP CONSULT TO NEUROLOGY  IP CONSULT TO CARDIOLOGY

## 2017-05-10 NOTE — H&P
Hospitalist Admission Note    NAME: Horace Clement   :  1950   MRN:  [id number]     Date/Time:  5/10/2017 1:01 AM    Patient PCP: None  ________________________________________________________________________    My assessment of this patient's clinical condition and my plan of care is as follows. Assessment / Plan:  Chest pain:  In setting of known CAD/CABG ~10yrs ago, DM, hyperlipidemia  - CXR with no acute findings  - serial troponin. Lipids in AM.  - echo ordered  - cardiology consult, ?consider stress test once stroke w/u complete  - con't ASA and lipitor  - Add lisinopril - stroke seen on CT is not acute so does not need permissive HTN. Nausea with vomiting:  Unclear etiology, resolved after receiving meds in ER  - IV fluids tonight  - zofran prn  - additional w/u pending return of sx  Stroke, subacute:  Unclear timing of stroke  - CT head with age indeterminate ischemia right parietal lobe. - MRI/MRA brain and neck ordered  - echo as above  - con't ASA and lipitor as above  - PT/OT eval.  No oral sx per son. Non insulin dependent DM2 controlled without complication:  - con't home glipizide  - lispro sliding scale    Code Status: Full  Surrogate Decision Maker: son  DVT Prophylaxis: lovenox        Subjective:   CHIEF COMPLAINT:  Nausea/vomiting    HISTORY OF PRESENT ILLNESS:     Marce Patel is a 74 y.o. [place] [facility] male who presents with above. His son is translating for history tonight. Pt in his usual state of health when he went to bed last night. He was woken up at 0400 by chest pain. He did not take any meds or do anything and the pain went away ~0630. However shortly afterwards, he developed nausea with vomiting. Son says he has been vomiting all day until he came to the ER and was given medications. He is not currently nauseated. No recent fever, cough or URI sx. No shortness of breath. He did feel dizzy when he had the chest pain.   No stool changes with his vomiting. No focal weakness noted. We were asked to admit for work up and evaluation of the above problems. Past Medical History:   Diagnosis Date    CAD (coronary artery disease)     Diabetes ([address]     Hypercholesteremia     Hypertension         Past Surgical History:   Procedure Laterality Date    CARDIAC SURG PROCEDURE UNLIST      CABG 2007       Social History   Substance Use Topics    Smoking status: Never Smoker    Smokeless tobacco: Not on file    Alcohol use Not on file        Family History   Problem Relation Age of Onset    Diabetes Brother      No Known Allergies     Prior to Admission medications    Medication Sig Start Date End Date Taking? Authorizing Provider   aspirin 81 mg chewable tablet Take 81 mg by mouth daily. Yes Alyssa Lepe MD   atorvastatin (LIPITOR) 10 mg tablet Take 10 mg by mouth daily. Yes Alyssa Lepe MD   glipiZIDE (GLUCOTROL) 10 mg tablet Take 10 mg by mouth two (2) times a day. Yes Alyssa Lepe MD       REVIEW OF SYSTEMS:     I am not able to complete the review of systems because:    The patient is intubated and sedated    The patient has altered mental status due to his acute medical problems    The patient has baseline aphasia from prior stroke(s)    The patient has baseline dementia and is not reliable historian    The patient is in acute medical distress and unable to provide information           Total of 12 systems reviewed as follows:       POSITIVE= underlined text  Negative = text not underlined  General:  fever, chills, sweats, generalized weakness, weight loss/gain,      loss of appetite   Eyes:    blurred vision, eye pain, loss of vision, double vision  ENT:    rhinorrhea, pharyngitis   Respiratory:   cough, sputum production, SOB, NICOLE, wheezing, pleuritic pain   Cardiology:   chest pain, palpitations, orthopnea, PND, edema, syncope   Gastrointestinal:  abdominal pain , N/V, diarrhea, dysphagia, constipation, bleeding   Genitourinary:  frequency, urgency, dysuria, hematuria, incontinence   Muskuloskeletal :  arthralgia, myalgia, back pain  Hematology:  easy bruising, nose or gum bleeding, lymphadenopathy   Dermatological: rash, ulceration, pruritis, color change / jaundice  Endocrine:   hot flashes or polydipsia   Neurological:  headache, dizziness, confusion, focal weakness, paresthesia,     Speech difficulties, memory loss, gait difficulty  Psychological: Feelings of anxiety, depression, agitation    Objective:   VITALS:    Visit Vitals    /71    Pulse 68    Temp 97.9 °F (36.6 °C)    Resp 12    Ht 5' 3\" (1.6 m)    Wt 61.1 kg (134 lb 11.2 oz)    SpO2 98%    BMI 23.86 kg/m2       PHYSICAL EXAM:    General:    Alert, cooperative, no distress, appears stated age. HEENT: Atraumatic, anicteric sclerae, pink conjunctivae     No oral ulcers, mucosa moist, throat clear, dentition fair  Neck:  Supple, symmetrical,  thyroid: non tender  Lungs:   Clear to auscultation bilaterally. No Wheezing or Rhonchi. No rales. Chest wall:  No tenderness  No Accessory muscle use. Heart:   Regular  rhythm,  No  murmur   No edema  Abdomen:   Soft, non-tender. Not distended. Bowel sounds normal  Extremities: No cyanosis. No clubbing,      Skin turgor normal, Capillary refill normal, Radial dial pulse 2+  Skin:     Not pale. Not Jaundiced  No rashes   Psych:  Cannot assess insight. Not depressed. Not anxious or agitated. Neurologic: EOMs intact. No facial asymmetry. No aphasia or slurred speech. Symmetrical strength, Sensation grossly intact.  Alert and oriented X 4.     _______________________________________________________________________  Care Plan discussed with:    Comments   Patient x    Family  x son   RN x    Care Manager                    Consultant:      _______________________________________________________________________  Expected  Disposition:   Home with Family x   HH/PT/OT/RN    SNF/LTC    [facility] ________________________________________________________________________  TOTAL TIME:  50 Minutes    Critical Care Provided     Minutes non procedure based      Comments    x Reviewed previous records   >50% of visit spent in counseling and coordination of care x Discussion with patient and/or family and questions answered       ________________________________________________________________________  Signed: Marion Huerta MD    Procedures: see electronic medical records for all procedures/Xrays and details which were not copied into this note but were reviewed prior to creation of Plan. LAB DATA REVIEWED:    Recent Results (from the past 24 hour(s))   EKG, 12 LEAD, INITIAL    Collection Time: 05/09/17  7:51 PM   Result Value Ref Range    Ventricular Rate 78 BPM    Atrial Rate 78 BPM    P-R Interval 144 ms    QRS Duration 104 ms    Q-T Interval 384 ms    QTC Calculation (Bezet) 437 ms    Calculated P Axis 30 degrees    Calculated R Axis -39 degrees    Calculated T Axis 132 degrees    Diagnosis       Normal sinus rhythm  Possible Left atrial enlargement  Left axis deviation  ST & T wave abnormality, consider anterolateral ischemia  Abnormal ECG  No previous ECGs available     GLUCOSE, POC    Collection Time: 05/09/17  8:13 PM   Result Value Ref Range    Glucose (POC) 186 (H) 65 - 100 mg/dL    Performed by Laura Toribio    CBC WITH AUTOMATED DIFF    Collection Time: 05/09/17  8:16 PM   Result Value Ref Range    WBC 7.6 4.1 - 11.1 K/uL    RBC 4.96 4.10 - 5.70 M/uL    HGB 14.5 12.1 - 17.0 g/dL    HCT 41.6 36.6 - 50.3 %    MCV 83.9 80.0 - 99.0 FL    MCH 29.2 26.0 - 34.0 PG    MCHC 34.9 30.0 - 36.5 g/dL    RDW 12.8 11.5 - 14.5 %    PLATELET 372 227 - 670 K/uL    NEUTROPHILS 70 32 - 75 %    LYMPHOCYTES 20 12 - 49 %    MONOCYTES 8 5 - 13 %    EOSINOPHILS 2 0 - 7 %    BASOPHILS 0 0 - 1 %    ABS. NEUTROPHILS 5.4 1.8 - 8.0 K/UL    ABS. LYMPHOCYTES 1.5 0.8 - 3.5 K/UL    ABS. MONOCYTES 0.6 0.0 - 1.0 K/UL    ABS.  EOSINOPHILS 0.2 0.0 - 0.4 K/UL    ABS. BASOPHILS 0.0 0.0 - 0.1 K/UL   METABOLIC PANEL, COMPREHENSIVE    Collection Time: 05/09/17  8:16 PM   Result Value Ref Range    Sodium 139 136 - 145 mmol/L    Potassium 3.7 3.5 - 5.1 mmol/L    Chloride 105 97 - 108 mmol/L    CO2 26 21 - 32 mmol/L    Anion gap 8 5 - 15 mmol/L    Glucose 191 (H) 65 - 100 mg/dL    BUN 15 6 - 20 MG/DL    Creatinine 1.08 0.70 - 1.30 MG/DL    BUN/Creatinine ratio 14 12 - 20      GFR est AA >60 >60 ml/min/1.73m2    GFR est non-AA >60 >60 ml/min/1.73m2    Calcium 9.0 8.5 - 10.1 MG/DL    Bilirubin, total 0.9 0.2 - 1.0 MG/DL    ALT (SGPT) 33 12 - 78 U/L    AST (SGOT) 14 (L) 15 - 37 U/L    Alk.  phosphatase 100 45 - 117 U/L    Protein, total 7.8 6.4 - 8.2 g/dL    Albumin 3.8 3.5 - 5.0 g/dL    Globulin 4.0 2.0 - 4.0 g/dL    A-G Ratio 1.0 (L) 1.1 - 2.2     CK W/ REFLX CKMB    Collection Time: 05/09/17  8:16 PM   Result Value Ref Range     39 - 308 U/L   TROPONIN I    Collection Time: 05/09/17  8:16 PM   Result Value Ref Range    Troponin-I, Qt. <0.04 <0.05 ng/mL

## 2017-05-10 NOTE — ED NOTES
TRANSFER - OUT REPORT:    Verbal report given to Leigh(name) on Vasiliy Springer  [facility] of care       Report consisted of patients Situation, Background, Assessment and   Recommendations(SBAR). Information from the following report(s) SBAR, Kardex, ED Summary, [facility] and Recent Results was reviewed with the receiving nurse. Lines:       Opportunity for questions and clarification was provided.       Patient transported with:   [employer]

## 2017-05-10 NOTE — ED NOTES
Assumed care for patient after receiving verbal report from [address]. Patient remains unchanged since initial assessment. Patient currently denies pain. Family member at bedside. MRI paper screening sheet filled out and faxed to MRI. No distress noted. Will continue to monitor.

## 2017-05-10 NOTE — ED PROVIDER NOTES
HPI Comments: 64yom with hx of bypass 2008, HTN, NIDDM, who presents with dizziness and CP. Pt was out gardening yesterday, and felt dehydrated last night. This AM pt woke up with vertiginous symptoms that were clockwise in nature. Pt then developed CP that lasted for 2 hrs, and then resolved. Pt also had N/V. 1 hr PTA pt's dizziness resolved as well. Pt took 1 baby ASA earlier today, has not had recent stress or cath. No hx of vertigo in the past    Patient is a 74 y.o. male presenting with dizziness and chest pain. The history is provided by a relative. The history is limited by a language barrier. Dizziness   This is a new problem. The current episode started 12 to 24 hours ago. The problem has been resolved. There was no focality noted. Primary symptoms include loss of balance. Pertinent negatives include no focal weakness, no agitation, no mental status change and no disorientation. There has been no fever. Associated symptoms include chest pain, vomiting and nausea. Pertinent negatives include no shortness of breath and no headaches. Chest Pain (Angina)    This is a new problem. The current episode started 6 to 12 hours ago. The problem has been resolved. Duration of episode(s) is 2 hours. The problem occurs rarely. The pain is present in the substernal region. The pain does not radiate. Associated symptoms include dizziness, nausea, vomiting and weakness. Pertinent negatives include no abdominal pain, no cough, no fever, no headaches, no palpitations and no shortness of breath. Procedural history includes CABG. Past Medical History:   Diagnosis Date    CAD (coronary artery disease)     Diabetes ([address]     Hypercholesteremia     Hypertension        Past Surgical History:   Procedure Laterality Date    CARDIAC SURG PROCEDURE UNLIST      CABG 2007         History reviewed. No pertinent family history.     Social History     Social History    Marital status:      Spouse name: N/A    Number of children: N/A    Years of education: N/A     Occupational History    Not on file. Social History Main Topics    Smoking status: Never Smoker    Smokeless tobacco: Not on file    Alcohol use Not on file    Drug use: Not on file    Sexual activity: Not on file     Other Topics Concern    Not on file     Social History Narrative    No narrative on file         ALLERGIES: Review of patient's allergies indicates no known allergies. Review of Systems   Constitutional: Positive for activity change and fatigue. Negative for chills and fever. Eyes: Negative for photophobia and visual disturbance. Respiratory: Negative for cough, chest tightness and shortness of breath. Cardiovascular: Positive for chest pain. Negative for palpitations and leg swelling. Gastrointestinal: Positive for nausea and vomiting. Negative for abdominal pain. Musculoskeletal: Negative for arthralgias and myalgias. Skin: Negative for color change and rash. Neurological: Positive for dizziness, weakness and loss of balance. Negative for focal weakness, syncope and headaches. Psychiatric/Behavioral: Negative for agitation and behavioral problems. Vitals:    05/09/17 1943 05/09/17 1958   BP: (!) 196/93 (!) 182/92   Pulse: 72    Resp: 18    Temp: 97.9 °F (36.6 °C)    SpO2: 100%    Weight: 61.1 kg (134 lb 11.2 oz)    Height: 5' 3\" (1.6 m)             Physical Exam   Constitutional: He is oriented to person, place, and time. He appears well-developed and well-nourished. No distress. HENT:   Head: Normocephalic and atraumatic. Eyes: EOM are normal. Pupils are equal, round, and reactive to light. Neck: No JVD present. No tracheal deviation present. Cardiovascular: Normal rate and regular rhythm. Exam reveals no friction rub. No murmur heard. Pulmonary/Chest: Effort normal and breath sounds normal. No respiratory distress. He has no wheezes. Abdominal: Soft. He exhibits no distension.  There is no tenderness. There is no rebound and no guarding. Musculoskeletal: Normal range of motion. He exhibits no edema. Neurological: He is alert and oriented to person, place, and time. No cranial nerve deficit. Skin: Skin is warm and dry. He is not diaphoretic. Psychiatric: He has a normal mood and affect. His behavior is normal.        MDM  Number of Diagnoses or Management Options  Diagnosis management comments: 64yom with PMH of CABG, HTN, DM, who presents with vertiginous symptoms and CP. EKG is not normal but no baseline is known    H- 1/2  E- 1/2  A- 1/2  R- 2/2  T- 0/2    Total HEART score is 5    Will check CBC to r/o anemia and bandemia  Will check BMP to evaluate kidney function and electrolytes  Will check troponin to evaluate for myocardial ischemia  Will check EKG to r/o arrhythmia and evaluate for STEMI  Will check CXR to evaluate cardiac silhouette and lung fields  Will give fluids and also 324 aspirin. EKG: NSR at rate of 78, LAD, normal conduction, <1mm j point elevation in V2,v3, with biphasic T waves noted in V2 and V3. TWI noted in I/AVL, and V4-V6       Amount and/or Complexity of Data Reviewed  Clinical lab tests: ordered and reviewed  Tests in the radiology section of CPT®: ordered and reviewed  Tests in the medicine section of CPT®: ordered and reviewed  Obtain history from someone other than the patient: yes (Son)  Discuss the patient with other providers: yes (Neurology  Hospitalist)  Independent visualization of images, tracings, or specimens: yes    Patient Progress  Patient progress: stable    ED Course       Procedures    CONSULT NOTE:   9:43 PM  Shawanda Torres MD spoke with Dr. Nathanael Adams   Specialty: Neurology  Discussed pt's hx, disposition, and available diagnostic and imaging results over the telephone. Reviewed care plans. Consulting physician agrees with plans as outlined. Dr. Nathanael Adams recommends admitting the pt to the hospitalist for a stroke workup.    Written by Luis Alberto Eduardo, ED Scribe, as dictated by Latoya Rod MD.    CONSULT NOTE:   9:45 PM  Latoya Rod MD spoke with Dr. Nura Pepper,   Specialty: Hospitalist  Discussed pt's hx, disposition, and available diagnostic and imaging results. Reviewed care plans. Consultant will evaluate pt for admission. Written by Luis Alberto Eduardo ED Scribe, as dictated by Latoya Rod MD.    LABORATORY TESTS:  Recent Results (from the past 12 hour(s))   EKG, 12 LEAD, INITIAL    Collection Time: 05/09/17  7:51 PM   Result Value Ref Range    Ventricular Rate 78 BPM    Atrial Rate 78 BPM    P-R Interval 144 ms    QRS Duration 104 ms    Q-T Interval 384 ms    QTC Calculation (Bezet) 437 ms    Calculated P Axis 30 degrees    Calculated R Axis -39 degrees    Calculated T Axis 132 degrees    Diagnosis       Normal sinus rhythm  Possible Left atrial enlargement  Left axis deviation  ST & T wave abnormality, consider anterolateral ischemia  Abnormal ECG  No previous ECGs available     GLUCOSE, POC    Collection Time: 05/09/17  8:13 PM   Result Value Ref Range    Glucose (POC) 186 (H) 65 - 100 mg/dL    Performed by Jarrod Nova    CBC WITH AUTOMATED DIFF    Collection Time: 05/09/17  8:16 PM   Result Value Ref Range    WBC 7.6 4.1 - 11.1 K/uL    RBC 4.96 4.10 - 5.70 M/uL    HGB 14.5 12.1 - 17.0 g/dL    HCT 41.6 36.6 - 50.3 %    MCV 83.9 80.0 - 99.0 FL    MCH 29.2 26.0 - 34.0 PG    MCHC 34.9 30.0 - 36.5 g/dL    RDW 12.8 11.5 - 14.5 %    PLATELET 115 865 - 749 K/uL    NEUTROPHILS 70 32 - 75 %    LYMPHOCYTES 20 12 - 49 %    MONOCYTES 8 5 - 13 %    EOSINOPHILS 2 0 - 7 %    BASOPHILS 0 0 - 1 %    ABS. NEUTROPHILS 5.4 1.8 - 8.0 K/UL    ABS. LYMPHOCYTES 1.5 0.8 - 3.5 K/UL    ABS. MONOCYTES 0.6 0.0 - 1.0 K/UL    ABS. EOSINOPHILS 0.2 0.0 - 0.4 K/UL    ABS.  BASOPHILS 0.0 0.0 - 0.1 K/UL   METABOLIC PANEL, COMPREHENSIVE    Collection Time: 05/09/17  8:16 PM   Result Value Ref Range    Sodium 139 136 - 145 mmol/L    Potassium 3.7 3.5 - 5.1 mmol/L    Chloride 105 97 - 108 mmol/L    CO2 26 21 - 32 mmol/L    Anion gap 8 5 - 15 mmol/L    Glucose 191 (H) 65 - 100 mg/dL    BUN 15 6 - 20 MG/DL    Creatinine 1.08 0.70 - 1.30 MG/DL    BUN/Creatinine ratio 14 12 - 20      GFR est AA >60 >60 ml/min/1.73m2    GFR est non-AA >60 >60 ml/min/1.73m2    Calcium 9.0 8.5 - 10.1 MG/DL    Bilirubin, total 0.9 0.2 - 1.0 MG/DL    ALT (SGPT) 33 12 - 78 U/L    AST (SGOT) 14 (L) 15 - 37 U/L    Alk. phosphatase 100 45 - 117 U/L    Protein, total 7.8 6.4 - 8.2 g/dL    Albumin 3.8 3.5 - 5.0 g/dL    Globulin 4.0 2.0 - 4.0 g/dL    A-G Ratio 1.0 (L) 1.1 - 2.2     CK W/ REFLX CKMB    Collection Time: 05/09/17  8:16 PM   Result Value Ref Range     39 - 308 U/L   TROPONIN I    Collection Time: 05/09/17  8:16 PM   Result Value Ref Range    Troponin-I, Qt. <0.04 <0.05 ng/mL       IMAGING RESULTS:  CT Results  (Last 48 hours)               05/09/17 2111  CT HEAD WO CONT Final result    Impression:  IMPRESSION: Age indeterminate ischemia right parietal lobe. This could further   be assessed by MRI if indicated. Narrative:  EXAM:  CT HEAD WO CONT       INDICATION:   eval for head bleed, dizziness. COMPARISON: None. TECHNIQUE: Unenhanced CT of the head was performed using 5 mm images. Brain and   bone windows were generated. CT dose reduction was achieved through use of a   standardized protocol tailored for this examination and automatic exposure   control for dose modulation. FINDINGS:   The ventricles and sulci are normal in size, shape and configuration and   midline. There is hypoattenuation in the right posterior parietal lobe. There is   a focal area of low attenuation in the brainstem which appears chronic. There is   no intracranial hemorrhage, extra-axial collection, mass, mass effect or midline   shift. The basilar cisterns are open. No acute infarct is identified. The bone   windows demonstrate no abnormalities.  There is mucosal thickening of ethmoid   sinuses. CXR Results  (Last 48 hours)               05/09/17 2009  XR CHEST PA LAT Final result    Impression:  IMPRESSION: No acute findings. Narrative:  History: Chest pain. 2 views of the chest demonstrate the patient is status post median sternotomy   and CABG. Heart size is unremarkable. The lungs are clear. The osseous   structures appear normal.                  MEDICATIONS GIVEN:  Medications   aspirin (ASPIRIN) tablet 325 mg (not administered)   meclizine (ANTIVERT) tablet 25 mg (25 mg Oral Given 5/9/17 2130)   sodium chloride 0.9 % bolus infusion 1,000 mL (1,000 mL IntraVENous New Bag 5/9/17 2042)   prochlorperazine (COMPAZINE) with saline injection 10 mg (10 mg IntraVENous Given 5/9/17 2042)       IMPRESSION:  No diagnosis found. Stroke unknown age  Vertigo      PLAN: Admit to Hospitalist    Admit Note:  9:45 PM  Patient is being admitted to the hospital by Dr. Dorian Castro. The results of their tests and reasons for their admission have been discussed with the patient and/or available family. They convey their agreement and understanding for the need to be admitted and for their admission diagnosis. Written by TORIBIO Barth, as dictated by Sarah Soto MD.     ------------------------------------------  Begin Attending Documentation  ------------------------------------------    Attending Attestation: I was not present during the patient's evaluation by the resident. I personally evaluated the patient including the history and physical. I have read the resident's note and agree with their history, physical and plan. Rina Castanon is a 74 y.o. male with a hx of CABG, CAD, stroke (2 years ago), HTN, hypercholesteremia, and DM presenting to the ED with his wife and son C/O dizziness which started early this morning. Associated symptoms include nausea, vomiting, chills, and diaphoresis. Pt denies hx of similar symptoms.  Son also notes he was experiencing midsternal, non-radiating CP from 4:00-6:30 AM this morning which has since resolved. He had a CABG performed 10 years ago by his cardiologist in [facility] but moved to the [address] [place] [address] 3 months ago and has not followed up with a cardiologist in several years. Pt also takes ASA daily. Patient denies HA, vision changes, abd pain, diarrhea, urinary symptoms, numbness/tingling, or any other symptoms or complaints. There are no other complaints, changes or physical findings at this time. There is a language barrier and son served as the . Attestation: This note is prepared by Boston Bunn, acting as Scribe for Lizette Mathews MD.    Lizette Mathews MD: The scribe's documentation has been prepared under my direction and personally reviewed by me in its entirety. I confirm that the note above accurately reflects all work, treatment, procedures, and medical decision making performed by me. PE:  Gen: NAD, WD/WN   Eyes: horizontal nystagmus on exam   Heart: nl S1, S2, no m/r/g   Lungs: CTAB, no w/r/r   Abd: soft, nttp, ND   Ext: no swelling   Skin: no rashes   Neuro: grossly intact, no focal deficits    Assessment: Patient presents to ED with dizziness. He also reports chest pain this morning that has since resolved. Differential includes BPPV, labyrinthitis, central vertigo, TIA, CVA, atypical chest pain, stable angina, unstable angina, MI, PE, pleurisy, costochondritis, pneumonia, bronchitis, MSK pain.   Do not suspect dissection.  - CBC, CMP, Ochoa  - HCT  - Symptomatic management and reevaluate    Diagnosis: Vertigo, stroke of undetermined age    Deya Thurman MD.    ------------------------------------------  End Attending Documentation  ------------------------------------------

## 2017-05-10 NOTE — PROGRESS NOTES
Pt was admitted through the ED with complaints of chest pain, nausea with vomiting, and stroke (unclear timing). Pt's primary language is [employer], his son is her to assist with translation. Pt moved from [facility] in 2-1017. He and his spouse live with his son. Pt has been independent with all ADLs and IADLs. He has applied for a [employer]. He does not have health insurance or a PCP. Pt's son was given a [employer] and a list of [facility] [facility]. CM offered assistance in connecting pt with a PCP. Pt's son stated he would look at list and follow up with CM. Referral made via e-mail to [facility] with pt's son contact information. PT/OT evals pending. CM will follow for d/c needs. Care Management Interventions  PCP Verified by CM: No (new to area has not estblished yet)  Transition of Care Consult (CM Consult):  Other (establish with a PCP)  Discharge Durable Medical Equipment: No  Physical Therapy Consult: Yes  Occupational Therapy Consult: Yes  Speech Therapy Consult: No  Current Support Network: Relative's Home  Confirm Follow Up Transport: Family  Plan discussed with Pt/Family/Caregiver: Yes  Discharge Location  Discharge Placement: [address] [facility], [address] [place] [id number]

## 2017-05-10 NOTE — PROGRESS NOTES
Received patient from ER via wheelchair. Alert and oriented Denies pain SOB of dizziness. Telemetry applied. Patient speaks limited [place]. But speaks farsi. Son wtih patient and is translating at present. Explained to patient and son the need for blue  phone for consents and when son not here. He verbalized understanding  7501 Dual skin assessment done with Rosa.  Surgical scars and insect bites on legs

## 2017-05-10 NOTE — PROGRESS NOTES
BSV Stroke Education Kim William and Stroke Education provided to patient and relative(s) and the following topics were discussed    1. Patients personal risk factors for stroke are hypertension, carotid stenosis, hyperlipidemia and diabetes mellitus    2. Warning signs of Stroke:        * Sudden numbness or weakness of the face, arm or leg, especially on one side of          The body            * Sudden confusion, trouble speaking or understanding        * Sudden trouble seeing in one or both eyes        * Sudden trouble walking, dizziness, loss of balance or coordination        * Sudden severe headache with no known cause      3. Importance of activation Emergency Medical Services ( 9-1-1 ) immediately if                       experience any warning signs of stroke. 4. Be sure and schedule a follow-up appointment with your primary care doctor or any                  specialists as instructed. 5. You must take medicine every day to treat your risk factors for stroke. Be sure to take your medicines exactly as your doctor tells you: no more, no less. Know what your medicines are for , what they do. Anti-thrombotics /anticoagulants can help prevent strokes. You are taking the following medicine(s)  asa     6. Smoking and second-hand smoke greatly increase your risk of stroke, cardiovascular disease and death.  Smoking history never

## 2017-05-10 NOTE — ED NOTES
Pt resting in stretcher. Call bell within reach. Updated on plan of care. Initiated maintenance fluids. No other complaints voiced at this time.

## 2017-05-10 NOTE — ED NOTES
73 yo male Pt with PMH of HTN, DM, hypercholesterol and CAD with CABG presents to ED for sudden onset nausea, emesis, chest pain and dizziness since approx 4am today, pt speaks mostly [place], history provided by family member in attendance, oriented to room and call bell, cardiac and continuous spO2 monitoring initiated, IV started, blood samples collected and sent to lab for analysis, EKG completed, NIH completed by ED attending, plan of care reviewed, call bell in reach, side rails up x2, will continue to monitor. 21:49 PM   Medicated  with Compazine for nausea and Meclizine as ordered, sleeping comfortably, family at bedside, safety maintained.

## 2017-05-10 NOTE — CONSULTS
[address] [place] [address] [facility]     Date of  Admission: 5/9/2017  7:47 PM     Admission type:Emergency    Consult for: chest pain  Consult by: ED MD     Subjective:     Juliann Castro is a 74 y.o. male admitted for Chest pain. Pervious hx of CABG 2008 in [facility], HTN (uncontrolled on admission). Admitted with c/o of CP, N/V, dizziness after working in garden yesterday.  (family member) at bedside. Recently moved here from [facility]. ECG with nonspecific changes, neg troponins. MRI showing CVA , but not acute. Neurology to see. Currently patient denies CP, SOB. Cardiac risk factors: family history, male gender, hypertension.        Patient Active Problem List    Diagnosis Date Noted    Chest pain 05/10/2017      None  Past Medical History:   Diagnosis Date    CAD (coronary artery disease)     Diabetes ([address]     Hypercholesteremia     Hypertension       Social History     Social History    Marital status:      Spouse name: N/A    Number of children: N/A    Years of education: N/A     Social History Main Topics    Smoking status: Never Smoker    Smokeless tobacco: None    Alcohol use None    Drug use: None    Sexual activity: Not Asked     Other Topics Concern    None     Social History Narrative     No Known Allergies   Family History   Problem Relation Age of Onset    Diabetes Brother       Current Facility-Administered Medications   Medication Dose Route Frequency    aspirin chewable tablet 81 mg  81 mg Oral DAILY    atorvastatin (LIPITOR) tablet 10 mg  10 mg Oral DAILY    sodium chloride (NS) flush 5-10 mL  5-10 mL IntraVENous Q8H    sodium chloride (NS) flush 5-10 mL  5-10 mL IntraVENous PRN    acetaminophen (TYLENOL) tablet 650 mg  650 mg Oral Q4H PRN    ondansetron (ZOFRAN) injection 4 mg  4 mg IntraVENous Q4H PRN    bisacodyl (DULCOLAX) tablet 5 mg  5 mg Oral DAILY PRN    enoxaparin (LOVENOX) injection 40 mg  40 mg SubCUTAneous DAILY    glucose chewable tablet 16 g  4 Tab Oral PRN    dextrose (D50W) injection syrg 12.5-25 g  12.5-25 g IntraVENous PRN    glucagon (GLUCAGEN) injection 1 mg  1 mg IntraMUSCular PRN    lisinopril (PRINIVIL, ZESTRIL) tablet 10 mg  10 mg Oral DAILY    nitroglycerin (NITROBID) 2 % ointment 1 Inch  1 Inch Topical Q6H PRN    0.45% sodium chloride infusion  50 mL/hr IntraVENous CONTINUOUS    insulin lispro (HUMALOG) injection   SubCUTAneous AC&HS        Review of Symptoms:   Constitutional: negative  Eyes: negative   Ears, nose, mouth, throat, and face: negative  Respiratory: negative   Cardiovascular: negative   Gastrointestinal: negative  Genitourinary:negative   Musculoskeletal:negative   Neurological: negative   Endocrine: negative          Objective:      Visit Vitals    /63    Pulse 80    Temp 99 °F (37.2 °C)    Resp 18    Ht 5' 3\" (1.6 m)    Wt 61.1 kg (134 lb 11.2 oz)    SpO2 98%    BMI 23.86 kg/m2       Physical:   General: WNWD /middle [place] male in no acute distress  Heart: RRR, no m/S3/JVD, no carotid bruits   Lungs: clear   Abdomen: Soft, +BS, NTND   Extremities: LE leia +DP/PT, no edema   Neurologic: Grossly normal    Data Review:   Recent Labs      05/10/17   0550  05/09/17 2016   WBC  8.0  7.6   HGB  12.0*  14.5   HCT  36.3*  41.6   PLT  191  228     Recent Labs      05/10/17   0550  05/09/17 2016   NA  142  139   K  3.6  3.7   CL  108  105   CO2  25  26   GLU  123*  191*   BUN  15  15   CREA  0.99  1.08   CA  8.2*  9.0   ALB   --   3.8   TBILI   --   0.9   SGOT   --   14*   ALT   --   33       Recent Labs      05/10/17   0550  05/09/17 2016   Noni Orozcok  <0.04  <0.04       No intake or output data in the 24 hours ending 05/10/17 1659     Cardiographics    Telemetry: SR  ECG: SR with nonspecific changes, no previous ECG to compare  Echocardiogram: pending  CXRAY:   no acute process     Assessment:       Active Problems:    Chest pain (5/10/2017)         Plan:     CAD with new onset of exertional angina:  Neg troponins, ECG nonspecific changes, and resolve of symptoms  Continue on ASA daily, started on statin and ACEI. Recommend starting low dose BB if OK with Neurology - do not want to drop BP too low. Or can discontinue ACEI and start BB  With hx of CABG approx 10 years ago, recommend nuclear stress study, likely as outpt   Echo pending    Thank you for consulting RCA. Pt seen and examined in details. Agree with NP A&P. Will get stress test once acute issues resolved.      Cara Ybarra MD

## 2017-05-10 NOTE — PROGRESS NOTES
Problem: Mobility Impaired (Adult and Pediatric)  Goal: *Acute Goals and Plan of Care (Insert Text)  Physical Therapy Goals  Initiated 5/10/2017  1. Patient will transfer from bed to chair and chair to bed with independence using the least restrictive device within 7 day(s). 2. Patient will perform sit to stand with independence within 7 day(s). 3. Patient will ambulate with independence for 100 feet with the least restrictive device within 7 day(s). 4. Patient will ascend/descend 5 stairs with handrail(s) with independence within 7 day(s). PHYSICAL THERAPY EVALUATION  Patient: Ankit Flores (48 y.o. male)  Date: 5/10/2017  Primary Diagnosis: Chest pain        Precautions:  Fall (non-english speaking, speaks [employer], son present and translated per patients request)      ASSESSMENT :  Based on the objective data described below, the patient presents with generalized weakness, mild balance impairments, and slightly decreased activity tolerance all limiting patients functional mobility. Patient reports per son translation \"head feeling heaving\" with activity but denies dizziness. Patient states being weaker but denies numbness or tingling. Patient demonstrates balance impairments, especially with higher level balance tasks, as supported by a moderate fall risk score 36/56 on the VALADEZ balance test. Patient required supervision overall for basic mobility (transfers, ambulation, etc) but patient was very active prior to admission (gardening day before admission) and requires CGA-Min A for higher level balance tasks. Encouraged patient to be up OOB frequently and encouraged him to continue to ambulate daily with nursing or family assistance. Expect patient to progress well with mobility and likely return to baseline prior to discharge. Discussed with patients son that PT will reassess d/c needs closer to discharge in case needs are indicated (possibly outpatient but expecting none).      Patient will benefit from skilled intervention to address the above impairments. Patients rehabilitation potential is considered to be Good  Factors which may influence rehabilitation potential include:   [X]         None noted  [ ]         Mental ability/status  [ ]         Medical condition  [ ]         Home/family situation and support systems  [ ]         Safety awareness  [ ]         Pain tolerance/management  [ ]         Other:        PLAN :  Recommendations and Planned Interventions:  [ ]           Bed Mobility Training             [X]    Neuromuscular Re-Education  [X]           Transfer Training                   [ ]    Orthotic/Prosthetic Training  [X]           Gait Training                         [ ]    Modalities  [X]           Therapeutic Exercises           [ ]    Edema Management/Control  [X]           Therapeutic Activities            [X]    Patient and Family Training/Education  [ ]           Other (comment):     Frequency/Duration: Patient will be followed by physical therapy  4 times a week to address goals. Discharge Recommendations: To Be Determined (likely none, possibly outpatient PT)   Further Equipment Recommendations for Discharge: None expected        SUBJECTIVE:   Patient with no complaints, agreeable to PT evaluation.  Requesting to stay up OOB post treatment       OBJECTIVE DATA SUMMARY:   HISTORY:    Past Medical History:   Diagnosis Date    CAD (coronary artery disease)      Diabetes ([facility] [address]      Hypercholesteremia      Hypertension       Past Surgical History:   Procedure Laterality Date    CARDIAC SURG PROCEDURE UNLIST         CABG 2007     Prior Level of Function/Home Situation: prior independence, very active at baseline   Personal factors and/or comorbidities impacting plan of care:      Home Situation  Home Environment: Private residence  # Steps to Enter: 1  Rails to Enter: No  One/Two Story Residence: Split level  # of Interior Steps: 5 (5 steps and 4 steps; tri-level)  [employer]: Both  Lift Chair Available: No  Living Alone: No  Support Systems: Family member(s), Spouse/Significant Other/Partner  Patient Expects to be Discharged to[de-identified] Private residence  Current DME Used/Available at Home: None  Tub or Shower Type: Shower     EXAMINATION/PRESENTATION/DECISION MAKING:   Critical Behavior:  Neurologic State: Alert     Hearing:   no deficits noted  Range Of Motion:  AROM: Within functional limits     Strength:    Strength: Generally decreased, functional     Tone & Sensation:   Tone: Normal  Sensation: Intact      Coordination:  Coordination: Generally decreased, functional     Functional Mobility:  Bed Mobility:  Rolling: Independent  Supine to Sit: Independent  Sit to Supine: Independent  Scooting: Independent  Transfers:  Sit to Stand: Stand-by asssistance  Stand to Sit: Stand-by asssistance     Balance:   Sitting: Intact  Standing: Impaired  Standing - Static: Good  Standing - Dynamic : Fair (impaired balance with higher level balance tasks)  Ambulation/Gait Training:  Distance (ft): 80 Feet (ft)  Assistive Device: Gait belt  Ambulation - Level of Assistance: Supervision  Gait Abnormalities: Decreased step clearance;Trunk sway increased  Base of Support: Widened  Speed/Mana: Pace decreased (<100 feet/min) (slightly; flucuated some)                 Patient with increased trunk sway and ANGELES, son reports decreased mana from baseline. No LOB noted.  Patient reports feeling \"harder\" than baseline, due to c/o weakness                  Functional Measure:  Argueta Balance Test:      Sitting to Standin  Standing Unsupported: 3  Sitting with Back Unsupported: 4  Standing to Sitting: 3  Transfers: 2  Standing Unsupported with Eyes Closed: 3  Standing Unsupported with Feet Together: 3  Reach Forward with Outstretched Arm: 4   Object: 3  Turn to Look Over Shoulders: 4  Turn 360 Degrees: 1  Alternate Foot on Step/Stool: 2  Standing Unsupported One Foot in Front: 0  Stand on One Le  Total: 36       56=Maximum possible score;   0-20=High fall risk  21-40=Moderate fall risk   41-56=Low fall risk      Argueta Balance Test and G-code impairment scale:  Percentage of Impairment CH     0%    CI     1-19% CJ     20-39% CK     40-59% CL     60-79% CM     80-99% CN      100%   Argueta   Score 0-56 56 45-55 34-44 23-33 12-22 1-11 0      G codes: In compliance with CMSs Claims Based Outcome Reporting, the following G-code set was chosen for this patient based on their primary functional limitation being treated: The outcome measure chosen to determine the severity of the functional limitation was the [facility] with a score of 36/56 which was correlated with the impairment scale.       · Mobility - Walking and Moving Around:               - CURRENT STATUS:    CJ - 20%-39% impaired, limited or restricted               - GOAL STATUS:           CI - 1%-19% impaired, limited or restricted               - D/C STATUS:              ---------------To be determined---------------      Physical Therapy Evaluation Charge Determination   History Examination Presentation Decision-Making   LOW Complexity : Zero comorbidities / personal factors that will impact the outcome / POC LOW Complexity : 1-2 Standardized tests and measures addressing body structure, function, activity limitation and / or participation in recreation  LOW Complexity : Stable, uncomplicated  Other outcome measures Argueta Balance  LOW       Based on the above components, the patient evaluation is determined to be of the following complexity level: LOW      Pain:    no c/o pain      Activity Tolerance:   No s/s of VS distress  After treatment:   [X]         Patient left in no apparent distress sitting up in chair  [ ]         Patient left in no apparent distress in bed  [X]         Call bell left within reach  [X]         Nursing notified  [X]         Caregiver present  [ ]         Bed alarm activated      COMMUNICATION/EDUCATION:   The patients plan of care was discussed with: Registered Nurse.  [X]         Fall prevention education was provided and the patient/caregiver indicated understanding. [X]         Patient/family have participated as able in goal setting and plan of care. [X]         Patient/family agree to work toward stated goals and plan of care. [ ]         Patient understands intent and goals of therapy, but is neutral about his/her participation. [ ]         Patient is unable to participate in goal setting and plan of care.      Thank you for this referral.  Deysi Schwab, PT, DPT   Time Calculation: 24 mins

## 2017-05-10 NOTE — PROGRESS NOTES
MRI NOTE    Mri screening sheet needs to be completed and signed before exam can be done    Please call [phone] when this is done.

## 2017-05-11 ENCOUNTER — APPOINTMENT (OUTPATIENT)
Dept: NUCLEAR MEDICINE | Age: 67
DRG: 198 | End: 2017-05-11
Attending: INTERNAL MEDICINE
Payer: MEDICAID

## 2017-05-11 VITALS
BODY MASS INDEX: 23.87 KG/M2 | RESPIRATION RATE: 18 BRPM | HEIGHT: 63 IN | TEMPERATURE: 98.2 F | OXYGEN SATURATION: 99 % | HEART RATE: 80 BPM | WEIGHT: 134.7 LBS | SYSTOLIC BLOOD PRESSURE: 164 MMHG | DIASTOLIC BLOOD PRESSURE: 74 MMHG

## 2017-05-11 LAB
ANION GAP BLD CALC-SCNC: 9 MMOL/L (ref 5–15)
ATTENDING PHYSICIAN, CST07: NORMAL
BASOPHILS # BLD AUTO: 0 K/UL (ref 0–0.1)
BASOPHILS # BLD: 0 % (ref 0–1)
BUN SERPL-MCNC: 17 MG/DL (ref 6–20)
BUN/CREAT SERPL: 16 (ref 12–20)
CALCIUM SERPL-MCNC: 8.4 MG/DL (ref 8.5–10.1)
CHLORIDE SERPL-SCNC: 111 MMOL/L (ref 97–108)
CO2 SERPL-SCNC: 24 MMOL/L (ref 21–32)
CREAT SERPL-MCNC: 1.06 MG/DL (ref 0.7–1.3)
DIAGNOSIS, 93000: NORMAL
DUKE TM SCORE RESULT, CST14: NORMAL
DUKE TREADMILL SCORE, CST13: 5456
ECG INTERP BEFORE EX, CST11: NORMAL
ECG INTERP DURING EX, CST12: NORMAL
EOSINOPHIL # BLD: 0.2 K/UL (ref 0–0.4)
EOSINOPHIL NFR BLD: 3 % (ref 0–7)
ERYTHROCYTE [DISTWIDTH] IN BLOOD BY AUTOMATED COUNT: 13 % (ref 11.5–14.5)
FUNCTIONAL CAPACITY, CST17: NORMAL
GLUCOSE BLD STRIP.AUTO-MCNC: 173 MG/DL (ref 65–100)
GLUCOSE SERPL-MCNC: 124 MG/DL (ref 65–100)
HCT VFR BLD AUTO: 34.1 % (ref 36.6–50.3)
HGB BLD-MCNC: 11.6 G/DL (ref 12.1–17)
KNOWN CARDIAC CONDITION, CST08: NORMAL
LYMPHOCYTES # BLD AUTO: 30 % (ref 12–49)
LYMPHOCYTES # BLD: 1.9 K/UL (ref 0.8–3.5)
MAX. DIASTOLIC BP, CST04: 71 MMHG
MAX. HEART RATE, CST05: 109 BPM
MAX. SYSTOLIC BP, CST03: 157 MMHG
MCH RBC QN AUTO: 28.9 PG (ref 26–34)
MCHC RBC AUTO-ENTMCNC: 34 G/DL (ref 30–36.5)
MCV RBC AUTO: 84.8 FL (ref 80–99)
MONOCYTES # BLD: 0.5 K/UL (ref 0–1)
MONOCYTES NFR BLD AUTO: 9 % (ref 5–13)
NEUTS SEG # BLD: 3.6 K/UL (ref 1.8–8)
NEUTS SEG NFR BLD AUTO: 58 % (ref 32–75)
OVERALL BP RESPONSE TO EXERCISE, CST16: NORMAL
OVERALL HR RESPONSE TO EXERCISE, CST15: NORMAL
PEAK EX METS, CST10: 1 METS
PLATELET # BLD AUTO: 184 K/UL (ref 150–400)
POTASSIUM SERPL-SCNC: 4.2 MMOL/L (ref 3.5–5.1)
PROTOCOL NAME, CST01: NORMAL
RBC # BLD AUTO: 4.02 M/UL (ref 4.1–5.7)
SERVICE CMNT-IMP: ABNORMAL
SODIUM SERPL-SCNC: 144 MMOL/L (ref 136–145)
TEST INDICATION, CST09: NORMAL
WBC # BLD AUTO: 6.3 K/UL (ref 4.1–11.1)

## 2017-05-11 PROCEDURE — 74011250636 HC RX REV CODE- 250/636

## 2017-05-11 PROCEDURE — 74011250636 HC RX REV CODE- 250/636: Performed by: INTERNAL MEDICINE

## 2017-05-11 PROCEDURE — A9500 TC99M SESTAMIBI: HCPCS

## 2017-05-11 PROCEDURE — 85025 COMPLETE CBC W/AUTO DIFF WBC: CPT | Performed by: EMERGENCY MEDICINE

## 2017-05-11 PROCEDURE — 82962 GLUCOSE BLOOD TEST: CPT

## 2017-05-11 PROCEDURE — 74011250637 HC RX REV CODE- 250/637: Performed by: INTERNAL MEDICINE

## 2017-05-11 PROCEDURE — 74011636637 HC RX REV CODE- 636/637: Performed by: EMERGENCY MEDICINE

## 2017-05-11 PROCEDURE — 36415 COLL VENOUS BLD VENIPUNCTURE: CPT | Performed by: EMERGENCY MEDICINE

## 2017-05-11 PROCEDURE — 74011250637 HC RX REV CODE- 250/637: Performed by: EMERGENCY MEDICINE

## 2017-05-11 PROCEDURE — 80048 BASIC METABOLIC PNL TOTAL CA: CPT | Performed by: EMERGENCY MEDICINE

## 2017-05-11 PROCEDURE — 93017 CV STRESS TEST TRACING ONLY: CPT

## 2017-05-11 RX ORDER — METOPROLOL TARTRATE 25 MG/1
12.5 TABLET, FILM COATED ORAL EVERY 12 HOURS
Qty: 30 TAB | Refills: 0 | Status: SHIPPED | OUTPATIENT
Start: 2017-05-11 | End: 2017-06-10

## 2017-05-11 RX ORDER — SODIUM CHLORIDE 0.9 % (FLUSH) 0.9 %
SYRINGE (ML) INJECTION
Status: COMPLETED
Start: 2017-05-11 | End: 2017-05-11

## 2017-05-11 RX ADMIN — CLOPIDOGREL BISULFATE 75 MG: 75 TABLET ORAL at 11:01

## 2017-05-11 RX ADMIN — Medication 10 ML: at 09:37

## 2017-05-11 RX ADMIN — OMEGA-3 FATTY ACIDS CAP DELAYED RELEASE 1000 MG 1 CAPSULE: 1000 CAPSULE DELAYED RELEASE at 11:01

## 2017-05-11 RX ADMIN — VALSARTAN 160 MG: 160 TABLET ORAL at 11:01

## 2017-05-11 RX ADMIN — METOPROLOL TARTRATE 12.5 MG: 25 TABLET ORAL at 11:01

## 2017-05-11 RX ADMIN — ATORVASTATIN CALCIUM 20 MG: 20 TABLET, FILM COATED ORAL at 11:01

## 2017-05-11 RX ADMIN — ENOXAPARIN SODIUM 40 MG: 40 INJECTION SUBCUTANEOUS at 12:00

## 2017-05-11 RX ADMIN — INSULIN LISPRO 2 UNITS: 100 INJECTION, SOLUTION INTRAVENOUS; SUBCUTANEOUS at 11:05

## 2017-05-11 RX ADMIN — ASPIRIN 81 MG CHEWABLE TABLET 81 MG: 81 TABLET CHEWABLE at 09:00

## 2017-05-11 RX ADMIN — Medication 10 ML: at 11:06

## 2017-05-11 RX ADMIN — REGADENOSON 0.4 MG: 0.08 INJECTION, SOLUTION INTRAVENOUS at 09:37

## 2017-05-11 NOTE — PROGRESS NOTES
Occupational Therapy  Chart reviewed. Referral for OT received. Rn requesting therapy defer activity until stress test completed. Pt leaving for test shortly. Will continue to follow.  Alison Miller, OT

## 2017-05-11 NOTE — DISCHARGE SUMMARY
[facility] Discharge Summary     Patient ID:  Vivien Ortiz  [id number]  1 y.o.  1950    PCP on record: None    Admit date: 5/9/2017  Discharge date and time: 5/11/2017      DISCHARGE DIAGNOSIS:      ICD-10-CM ICD-9-CM   1. Vertigo R42 780.4   2. Dizziness and giddiness R42 780.4   3. History of stroke Z86.73 V12.54   4. Controlled type 2 diabetes mellitus without complication, with long-term current use of insulin (HCC) E11.9 250.00    Z79.4 V58.67   5. Familial hypercholesterolemia E78.01 272.0   6. 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 Avila See ([facility]) male who presents with above. His son is translating for history tonight. Pt in his usual state of health when he went to bed last night. He was woken up at 0400 by chest pain. He did not take any meds or do anything and the pain went away ~0630. However shortly afterwards, he developed nausea with vomiting. Son says he has been vomiting all day until he came to the ER and was given medications. He is not currently nauseated. No recent fever, cough or URI sx. No shortness of breath. He did feel dizzy when he had the chest pain. No stool changes with his vomiting. No focal weakness noted.     We were asked to admit for work up and evaluation of the above problems. ______________________________________________________________________  DISCHARGE SUMMARY/HOSPITAL COURSE:  for full details see H&P, daily progress notes, labs, consult notes. Chest pain: In setting of known CAD/CABG ~10yrs ago, DM, hyperlipidemia  - CXR with no acute findings  - serial troponin normal. LDL 61.  - echo EF 55-60%. Rest baseline.  - cardiology consulted, Stress test showed no ischemia.   - con't ASA and lipitor  - Cont Diovan - stroke seen on CT is not acute so didnt not need permissive HTN. Nausea with vomiting: Unclear etiology, resolved after receiving meds in ER  - IV fluids given  - zofran prn given  - Symptoms resolved now    Stroke, subacute: Unclear timing of stroke  - CT head with age indeterminate ischemia right parietal lobe. - MRI/MRA brain-- No acute chnages  - echo as above  - con't ASA and lipitor as above  - PT/OT eval not needed. Pt at baseline. No oral sx per son. Non insulin dependent DM2 controlled without complication:  - con't home glipizide  - lispro sliding scale given in hospital        _______________________________________________________________________  Patient seen and examined by me on discharge day. Pertinent Findings:  Gen:    Not in distress  Chest: Clear lungs  CVS:   Regular rhythm. No edema  Abd:  Soft, not distended, not tender  Neuro:  Alert, AOx3  _______________________________________________________________________  DISCHARGE MEDICATIONS:   Discharge Medication List as of 5/11/2017  1:37 PM      CONTINUE these medications which have CHANGED    Details   metoprolol tartrate (LOPRESSOR) 25 mg tablet Take 0.5 Tabs by mouth every twelve (12) hours for 30 days. , Print, Disp-30 Tab, R-0         CONTINUE these medications which have NOT CHANGED    Details   atorvastatin (LIPITOR) 20 mg tablet Take 20 mg by mouth daily. , Historical Med      clopidogrel (PLAVIX) 75 mg tab Take 75 mg by mouth daily. , Historical Med      valsartan (DIOVAN) 160 mg tablet Take 160 mg by mouth daily. , Historical Med      fish oil-omega-3 fatty acids 300-500 mg cap Take 1,000 mg by mouth., Historical Med      aspirin 81 mg chewable tablet Take 81 mg by mouth daily. , Historical Med      glipiZIDE (GLUCOTROL) 10 mg tablet Take 10 mg by mouth two (2) times a day., Historical Med         STOP taking these medications       glyBURIDE (DIABETA) 1.25 mg tablet Comments:   Reason for Stopping:               My Recommended Diet, Activity, Wound Care, and follow-up labs are listed in the patient's Discharge Insturctions which I have personally completed and reviewed.     _______________________________________________________________________  DISPOSITION:    Home with Family: x   Home with HH/PT/OT/RN:    SNF/LTC:    CRUZ:    OTHER:        Condition at Discharge:  Stable  _______________________________________________________________________  Follow up with:   PCP : None  Follow-up Information     Follow up With Details Comments Contact Info    PCP  Please choose PCP from list and call and schedule appointment for 1-2 weeks post hospital.                Total time in minutes spent coordinating this discharge (includes going over instructions, follow-up, prescriptions, and preparing report for sign off to her PCP) :  45 minutes    Signed:  Jaziel Garcia MD

## 2017-05-11 NOTE — PROGRESS NOTES
Attempted to see patient for PT treatment after stress test complete and patient cleared by nursing. Patient is sleeping. Family member politely declines, saying that patient didn't sleep much last night and that he had been walking the hallways with the family. Will continue to follow, and check back tomorrow, 5/12/2017.

## 2017-05-11 NOTE — PROGRESS NOTES
5/11/2017 6:44 AM    Admit Date: 5/9/2017    Admit Diagnosis: Chest pain    Subjective:     Jones Hobbs   denies chest pain, chest pressure/discomfort, dyspnea, palpitations. Visit Vitals    /74    Pulse 67    Temp 98.5 °F (36.9 °C)    Resp 17    Ht 5' 3\" (1.6 m)    Wt 134 lb 11.2 oz (61.1 kg)    SpO2 96%    BMI 23.86 kg/m2     Current Facility-Administered Medications   Medication Dose Route Frequency    aspirin chewable tablet 81 mg  81 mg Oral DAILY    sodium chloride (NS) flush 5-10 mL  5-10 mL IntraVENous Q8H    sodium chloride (NS) flush 5-10 mL  5-10 mL IntraVENous PRN    acetaminophen (TYLENOL) tablet 650 mg  650 mg Oral Q4H PRN    ondansetron (ZOFRAN) injection 4 mg  4 mg IntraVENous Q4H PRN    bisacodyl (DULCOLAX) tablet 5 mg  5 mg Oral DAILY PRN    enoxaparin (LOVENOX) injection 40 mg  40 mg SubCUTAneous DAILY    glucose chewable tablet 16 g  4 Tab Oral PRN    dextrose (D50W) injection syrg 12.5-25 g  12.5-25 g IntraVENous PRN    glucagon (GLUCAGEN) injection 1 mg  1 mg IntraMUSCular PRN    nitroglycerin (NITROBID) 2 % ointment 1 Inch  1 Inch Topical Q6H PRN    0.45% sodium chloride infusion  50 mL/hr IntraVENous CONTINUOUS    insulin lispro (HUMALOG) injection   SubCUTAneous AC&HS    atorvastatin (LIPITOR) tablet 20 mg  20 mg Oral DAILY    clopidogrel (PLAVIX) tablet 75 mg  75 mg Oral DAILY    fish oil-omega-3 fatty acids 340-1,000 mg capsule 1 Cap  1 Cap Oral DAILY    valsartan (DIOVAN) tablet 160 mg  160 mg Oral DAILY    metoprolol tartrate (LOPRESSOR) tablet 12.5 mg  12.5 mg Oral Q12H         Objective:      Visit Vitals    /74    Pulse 67    Temp 98.5 °F (36.9 °C)    Resp 17    Ht 5' 3\" (1.6 m)    Wt 134 lb 11.2 oz (61.1 kg)    SpO2 96%    BMI 23.86 kg/m2       Physical Exam:  Abdomen: soft, non-tender.  Bowel sounds normal.   Extremities: no cyanosis or edema  Heart: regular rate and rhythm, S1, S2 normal, no murmur, click, rub or gallop  Lungs: clear to auscultation bilaterally  Neurologic: Grossly normal    Data Review:   Labs:    Recent Results (from the past 24 hour(s))   GLUCOSE, POC    Collection Time: 05/10/17  9:59 AM   Result Value Ref Range    Glucose (POC) 130 (H) 65 - 100 mg/dL    Performed by Jona Green (PCT)    GLUCOSE, POC    Collection Time: 05/10/17  1:00 PM   Result Value Ref Range    Glucose (POC) 250 (H) 65 - 100 mg/dL    Performed by Kaylah Turner (PCT)    GLUCOSE, POC    Collection Time: 05/10/17  4:17 PM   Result Value Ref Range    Glucose (POC) 116 (H) 65 - 100 mg/dL    Performed by Lyssa Dai (PCT)    TROPONIN I    Collection Time: 05/10/17  7:02 PM   Result Value Ref Range    Troponin-I, Qt. <0.04 <0.05 ng/mL   GLUCOSE, POC    Collection Time: 05/10/17  9:26 PM   Result Value Ref Range    Glucose (POC) 247 (H) 65 - 100 mg/dL    Performed by Didier Lemon    CBC WITH AUTOMATED DIFF    Collection Time: 05/11/17  4:27 AM   Result Value Ref Range    WBC 6.3 4.1 - 11.1 K/uL    RBC 4.02 (L) 4.10 - 5.70 M/uL    HGB 11.6 (L) 12.1 - 17.0 g/dL    HCT 34.1 (L) 36.6 - 50.3 %    MCV 84.8 80.0 - 99.0 FL    MCH 28.9 26.0 - 34.0 PG    MCHC 34.0 30.0 - 36.5 g/dL    RDW 13.0 11.5 - 14.5 %    PLATELET 768 353 - 167 K/uL    NEUTROPHILS 58 32 - 75 %    LYMPHOCYTES 30 12 - 49 %    MONOCYTES 9 5 - 13 %    EOSINOPHILS 3 0 - 7 %    BASOPHILS 0 0 - 1 %    ABS. NEUTROPHILS 3.6 1.8 - 8.0 K/UL    ABS. LYMPHOCYTES 1.9 0.8 - 3.5 K/UL    ABS. MONOCYTES 0.5 0.0 - 1.0 K/UL    ABS. EOSINOPHILS 0.2 0.0 - 0.4 K/UL    ABS.  BASOPHILS 0.0 0.0 - 0.1 K/UL   METABOLIC PANEL, BASIC    Collection Time: 05/11/17  4:27 AM   Result Value Ref Range    Sodium 144 136 - 145 mmol/L    Potassium 4.2 3.5 - 5.1 mmol/L    Chloride 111 (H) 97 - 108 mmol/L    CO2 24 21 - 32 mmol/L    Anion gap 9 5 - 15 mmol/L    Glucose 124 (H) 65 - 100 mg/dL    BUN 17 6 - 20 MG/DL    Creatinine 1.06 0.70 - 1.30 MG/DL    BUN/Creatinine ratio 16 12 - 20      GFR est AA >60 >60 ml/min/1.73m2    GFR est non-AA >60 >60 ml/min/1.73m2    Calcium 8.4 (L) 8.5 - 10.1 MG/DL       Telemetry: normal sinus rhythm      Assessment:     Active Problems:    Chest pain (5/10/2017)        Plan:     1. CAD, s/p CABG: no further chest pain. -ve cardiac enzymes. Stress test today. Continue current meds. BP controlled.

## 2017-05-11 NOTE — PROGRESS NOTES
* No surgery found *  Bedside shift change report given to Ham Garcia Dr (oncoming nurse) by Adrian Fierro RN(offgoing nurse). Report included the following information [facility] Phone:   [phone]      Significant changes during shift:  Seen by neurologist and cardiologist        Patient Information    Ghazal Mcdaniel  74 y.o.  5/9/2017  7:47 PM by Rola Trinidad MD. Ghazal Mcdaniel was admitted from Home    Problem List    Patient Active Problem List    Diagnosis Date Noted    Chest pain 05/10/2017     Past Medical History:   Diagnosis Date    CAD (coronary artery disease)     Diabetes ([facility] [address]     Hypercholesteremia     Hypertension          Core Measures:    CVA: Yes Yes  CHF:No No  PNA:No No    Activity Status:    OOB to Chair No  Ambulated this shift Yes   Bed Rest No    Supplemental O2: (If Applicable)    NC No  NRB No  Venti-mask No  On  Liters/min      LINES AND DRAINS:    DVT prophylaxis:    DVT prophylaxis Med- Yes  DVT prophylaxis SCD or OSEAS- No     Wounds: (If Applicable)    Wounds- No    Location     Patient Safety:    Falls Score Total Score: 1  Safety Level_______  Bed Alarm On? Yes  Sitter?  No    Plan for upcoming shift: PT OT , IVF, fs ac and hs        Discharge Plan: Yes HOme with son    Active Consults:  IP CONSULT TO TELE-NEUROLOGY  IP CONSULT TO NEUROLOGY  IP CONSULT TO CARDIOLOGY

## 2017-05-11 NOTE — PROGRESS NOTES
Chart reviewed, note that patient is due to have more testing today. Nursing asks PT to hold treatment until results of final round of tests are complete. Will try to see patient later today, or resume PT treatment tomorrow.

## 2017-05-11 NOTE — PROGRESS NOTES
Occupational Therapy  Chart reviewed, referral for OT received. Attempted to see pt again for OT ruperto. Pt's daughter present and reported pt is asleep. Daughter requested therapy not to wake pt and follow back later as able. Daughter reported pt is having no difficulty with mobility efforts. Will continue to follow.  Ariel Velazco, OT

## 2017-05-11 NOTE — PROGRESS NOTES
lexiscan stress test completed. Cardiolite images to follow.  May resume previous diet if okay with MD

## 2017-05-11 NOTE — PROGRESS NOTES
[address] [facility]  Nuclear Medicine heart scan is completed and pt transported back to [address] [facility]

## 2017-05-11 NOTE — PROGRESS NOTES
Care Management:    Patient discharged home today with family and niece transported. I called and spoke with son and he says he will look at PCP [facility] list and call and schedule patient an appointment. Care Management Interventions  PCP Verified by CM: No (new to area has not estblished yet)  Transition of Care Consult (CM Consult):  Other (establish with a PCP)  Discharge Durable Medical Equipment: No  Physical Therapy Consult: Yes  Occupational Therapy Consult: Yes  Speech Therapy Consult: No  Current Support Network: Relative's Home  Confirm Follow Up Transport: Family  Plan discussed with Pt/Family/Caregiver: Yes  Discharge Location  Discharge Placement: Home     Galileo Adames [facility] [address]

## 2017-05-11 NOTE — ROUTINE PROCESS
Bedside and Verbal shift change report given to Leigh  (oncoming nurse) by Veronica Carnes (offgoing nurse). Report included the following information SBAR, Kardex, ED Summary, Procedure Summary, Intake/Output, MAR, Accordion, Recent Results, Med Rec Status and Cardiac Rhythm NSR.

## 2017-05-11 NOTE — PROGRESS NOTES
Reviewed Discharge instructions including changes in medications and need to pick PCP and follow up in one week and to follow up with Dr Jacques Palmer in 2-4 weeks per  request of Karma Better NP. Family verbalized understanding. 1415 Discharged via wheelchair by volunteer.

## 2017-05-11 NOTE — PROGRESS NOTES
Resting heart scan completed. Consent for LEXISCAN stress test has been signed.  Please keep pt NPO for LEXISCAN stress test later this am

## 2017-05-11 NOTE — PROGRESS NOTES
* No surgery found *  Bedside shift change report given to Su Montalvo Inna (oncoming nurse) by Jesenia La RN(offgoing nurse). Report included the following information [facility] Phone:   [phone]      Significant changes during shift:  Seen by neurologist and cardiologist        Patient Information    Cristian Barth  74 y.o.  5/9/2017  7:47 PM by Mike Edwards MD. Cristian Barth was admitted from Home    Problem List    Patient Active Problem List    Diagnosis Date Noted    Chest pain 05/10/2017     Past Medical History:   Diagnosis Date    CAD (coronary artery disease)     Diabetes ([facility] [address]     Hypercholesteremia     Hypertension          Core Measures:    CVA: Yes Yes  CHF:No No  PNA:No No    Activity Status:    OOB to Chair No  Ambulated this shift Yes   Bed Rest No    Supplemental O2: (If Applicable)    NC No  NRB No  Venti-mask No  On  Liters/min      LINES AND DRAINS:    DVT prophylaxis:    DVT prophylaxis Med- Yes  DVT prophylaxis SCD or OSEAS- No     Wounds: (If Applicable)    Wounds- No    Location     Patient Safety:    Falls Score Total Score: 1  Safety Level_______  Bed Alarm On? Yes  Sitter?  No    Plan for upcoming shift: PT OT , IVF, fs ac and hs        Discharge Plan: Yes HOme with son    Active Consults:  IP CONSULT TO TELE-NEUROLOGY  IP CONSULT TO NEUROLOGY  IP CONSULT TO CARDIOLOGY

## 2017-05-11 NOTE — CARDIO/PULMONARY
C/P Rehab Note:    Chart Reviewed. Pt admitted with chest pain S/P negative stress testing. Teaching deferred.